# Patient Record
Sex: MALE | Employment: FULL TIME | URBAN - METROPOLITAN AREA
[De-identification: names, ages, dates, MRNs, and addresses within clinical notes are randomized per-mention and may not be internally consistent; named-entity substitution may affect disease eponyms.]

---

## 2019-07-25 NOTE — PROGRESS NOTES
"Subjective     Kirt Burt is a 39 year old male who presents to clinic today for the following health issues:    HPI   Concern -  Lump neck/ Thyroid area -  Mom had Thyroid Cancer  Onset:  1 month    Description:   Palpated lump    Intensity: mild    Progression of Symptoms:  same    Accompanying Signs & Symptoms:   None     Previous history of similar problem:   No    Precipitating factors:   Worsened by: NA    Alleviating factors:  Improved by: NA    Therapies Tried and outcome:     Not tender    He has notice a nodule lateral to his trachea over the past month. It is not tender or bothersome but he is concerned since his mother has thyroid cancer. He does have frequent weight fluctuations but denies any diarrhea, constipation, temperatures intolerance of bowel changes. No difficulty swallowing.     Reviewed and updated as needed this visit by Provider  Tobacco  Allergies  Meds  Problems  Med Hx  Surg Hx  Fam Hx       Review of Systems   GENERAL: +Weight fluctuations depending on diet. Denies fever, fatigue, weakness.  CARDIOVASCULAR: Denies chest pain, shortness of breath, irregular heartbeats, palpitations, or edema.  RESPIRATORY: Denies cough, hemoptysis, and shortness of breath.  GASTROINTESTINAL: Denies nausea, vomiting, change in appetite, abdominal pain, diarrhea, or constipation.  GENITOURINARY: Denies increased frequency, urgency, dysuria, hematuria, or incontinence.   MUSCULOSKELETAL: +Nodule right anterior neck.   NEUROLOGIC: Denies headache, fainting, dizziness, memory loss, numbness, tingling, or seizures.  PSYCHIATRIC: Denies depression, anxiety, mood swings, and thoughts of suicide.  ENDOCRINE: Denies heat and cold intolerance, polydipsia, or polyuria.         Objective    /78   Pulse 76   Temp 98  F (36.7  C)   Resp 16   Ht 1.791 m (5' 10.5\")   Wt 131.5 kg (290 lb)   SpO2 96%   BMI 41.02 kg/m    Body mass index is 41.02 kg/m .  Physical Exam   GENERAL: healthy, alert and " no distress  NECK: no adenopathy. Possible subtle prominence over lateral trachea/thyroid without any tenderness.   RESP: lungs clear to auscultation - no rales, rhonchi or wheezes  CV: regular rate and rhythm, normal S1 S2, no S3 or S4, no murmur, click or rub, no peripheral edema  NEURO: Normal strength and tone, mentation intact and speech normal. Gait is stable.         Assessment & Plan       ICD-10-CM    1. Thyroid nodule E04.1 TSH with free T4 reflex     US Thyroid   2. FH: thyroid cancer Z80.8 TSH with free T4 reflex     US Thyroid        He has noticed a right neck nodule and there was a subtle prominence over the right lateral thyroid area that may be a nodule and with his family history of thyroid cancer, I recommend a TSH along with a thyroid ultrasound. Will notify patient if any other testing is needed once results are back. In the meantime, he will monitor for any neck pain or change in size of the nodule. Will plan on follow up in 1 month for an annual physical with fasting labs as he has not been seen in the clinic for 5 years.       Vijay Bess PA-C  [facility]

## 2019-07-29 ENCOUNTER — OFFICE VISIT (OUTPATIENT)
Dept: FAMILY MEDICINE | Facility: OTHER | Age: 39
End: 2019-07-29
Payer: COMMERCIAL

## 2019-07-29 VITALS
RESPIRATION RATE: 16 BRPM | DIASTOLIC BLOOD PRESSURE: 78 MMHG | SYSTOLIC BLOOD PRESSURE: 111 MMHG | TEMPERATURE: 98 F | BODY MASS INDEX: 40.6 KG/M2 | WEIGHT: 290 LBS | HEART RATE: 76 BPM | HEIGHT: 71 IN | OXYGEN SATURATION: 96 %

## 2019-07-29 DIAGNOSIS — Z80.8 FH: THYROID CANCER: ICD-10-CM

## 2019-07-29 DIAGNOSIS — E04.1 THYROID NODULE: Primary | ICD-10-CM

## 2019-07-29 PROCEDURE — 99203 OFFICE O/P NEW LOW 30 MIN: CPT | Performed by: PHYSICIAN ASSISTANT

## 2019-07-29 PROCEDURE — 84443 ASSAY THYROID STIM HORMONE: CPT | Performed by: PHYSICIAN ASSISTANT

## 2019-07-29 PROCEDURE — 36415 COLL VENOUS BLD VENIPUNCTURE: CPT | Performed by: PHYSICIAN ASSISTANT

## 2019-07-29 ASSESSMENT — PAIN SCALES - GENERAL: PAINLEVEL: NO PAIN (0)

## 2019-07-29 ASSESSMENT — MIFFLIN-ST. JEOR: SCORE: 2244.62

## 2019-07-29 NOTE — PATIENT INSTRUCTIONS
Will order thyroid labs along with an ultrasound of your thyroid to determine if there are any nodules at all.    I recommend a physical with fasting labs over the next few months.     Thyroid Ultrasound:  [place]: [phone]  [place]: [phone]

## 2019-07-30 ENCOUNTER — TELEPHONE (OUTPATIENT)
Dept: FAMILY MEDICINE | Facility: OTHER | Age: 39
End: 2019-07-30

## 2019-07-30 LAB — TSH SERPL DL<=0.005 MIU/L-ACNC: 1.52 MU/L (ref 0.4–4)

## 2019-07-30 NOTE — LETTER
July 31, 2019      Kirt Burt  [address] [facility] [address]        Dear Kirt,     This letter is to inform you that your recent lab testing was normal.    Results for orders placed or performed in visit on 07/29/19   TSH with free T4 reflex   Result Value Ref Range    TSH 1.52 0.40 - 4.00 mU/L     Please let us know if you have any further questions or concerns.    Thanks,  [facility]

## 2019-07-30 NOTE — TELEPHONE ENCOUNTER
----- Message from Vijay Bess PA-C sent at 7/30/2019  4:35 PM CDT -----  Please call and notify patient that his thyroid labs are normal. Thanks.    Vijay Bess PA-C

## 2019-07-31 ENCOUNTER — TELEPHONE (OUTPATIENT)
Dept: FAMILY MEDICINE | Facility: OTHER | Age: 39
End: 2019-07-31

## 2019-07-31 ENCOUNTER — ANCILLARY PROCEDURE (OUTPATIENT)
Dept: ULTRASOUND IMAGING | Facility: CLINIC | Age: 39
End: 2019-07-31
Attending: PHYSICIAN ASSISTANT
Payer: COMMERCIAL

## 2019-07-31 DIAGNOSIS — E04.1 THYROID NODULE: ICD-10-CM

## 2019-07-31 DIAGNOSIS — Z80.8 FH: THYROID CANCER: ICD-10-CM

## 2019-07-31 PROCEDURE — 76536 US EXAM OF HEAD AND NECK: CPT | Performed by: RADIOLOGY

## 2019-07-31 NOTE — TELEPHONE ENCOUNTER
Left message asking patient to return call.  Please inform patient of RESULTS from Provider below.    Please call and notify patient that he has a small right sided thyroid nodule which is what he is feeling. It appears to be a benign nodule so no further follow up is necessary for this. Thank you.     Vijay Bess PA-C

## 2020-11-14 ENCOUNTER — HEALTH MAINTENANCE LETTER (OUTPATIENT)
Age: 40
End: 2020-11-14

## 2021-02-24 ENCOUNTER — OFFICE VISIT (OUTPATIENT)
Dept: SURGERY | Facility: CLINIC | Age: 41
End: 2021-02-24
Payer: COMMERCIAL

## 2021-02-24 VITALS
SYSTOLIC BLOOD PRESSURE: 112 MMHG | WEIGHT: 271 LBS | BODY MASS INDEX: 38.33 KG/M2 | HEART RATE: 65 BPM | DIASTOLIC BLOOD PRESSURE: 75 MMHG

## 2021-02-24 DIAGNOSIS — M79.89 SOFT TISSUE MASS: ICD-10-CM

## 2021-02-24 DIAGNOSIS — K57.20 PERFORATION OF SIGMOID COLON DUE TO DIVERTICULITIS: Primary | ICD-10-CM

## 2021-02-24 PROCEDURE — 99214 OFFICE O/P EST MOD 30 MIN: CPT | Performed by: SURGERY

## 2021-02-24 RX ORDER — NIACINAMIDE 500 MG
TABLET, EXTENDED RELEASE ORAL PRN
COMMUNITY
End: 2024-05-13

## 2021-02-24 NOTE — LETTER
2/24/2021         RE: Kirt Burt  [address] [place] [address]        Dear Colleague,    Thank you for referring your patient, Kirt Burt, to the [facility]. Please see a copy of my visit note below.    Patient seen for hospital follow up from perforated diverticulitis    HPI:  Patient is a 40 year old male  Known to me from hospital admission at [facility] earlier this month on February 7 with perforated diverticulitis  He was treated with Zosyn in the hospital and improved with the antibiotics and conservative measures with no need for emergent surgery.  He was discharged on Augmentin to complete his antibiotic course.  His CAT scan from the emergency room had additionally shown a large left gluteal lipoma  Currently feeling well.  No pain.  Bowels are moving well.  He is on a low fiber diet since discharge.  No fevers.    History from the hospitalization:  The patient is 40 y.o. male admitted on 2/7/2021 with about 3 day history of LLQ pain. Pain started overnight Thursday 2/4 and persisted through today. Pain has been primarily in LLQ but at times will feel something in suprapubic or RLQ area. Pain was moderate to severe Friday then maybe improved slightly on Saturday. Last night the pain seemed to get much worse so presented to ER today. Pain has been associated wth nausea and vomiting and fever, chills. Has been still passig flatus. Last BM was possibly Saturday and seemed normal.   Initially in ER was febrile and tachycardic with pain about 8/10. After fluids, tylenol, pain meds And starting zosyn pain now rated 3-4/10, is more comfortable, HR no longer tachycardic.      Review Of Systems    Skin: negative  Ears/Nose/Throat: negative  Respiratory: No shortness of breath, dyspnea on exertion, cough, or hemoptysis  Cardiovascular: negative  Gastrointestinal: as above  Genitourinary: negative  Musculoskeletal: gluteal lipoma  Neurologic:  negative  Hematologic/Lymphatic/Immunologic: negative  Endocrine: negative      Past Medical History:   Diagnosis Date     NO ACTIVE PROBLEMS        Past Surgical History:   Procedure Laterality Date     ARTHROSCOPY KNEE WITH MEDIAL MENISCECTOMY  5/7/2014    Procedure: ARTHROSCOPY KNEE WITH MEDIAL MENISCECTOMY;  Surgeon: Artis Dougherty MD;  Location: [facility] OR     NO HISTORY OF SURGERY         Social History     Socioeconomic History     Marital status:      Spouse name: Not on file     Number of children: 2     Years of education: Not on file     Highest education level: Not on file   Occupational History     Employer: [employer]   Social Needs     Financial resource strain: Not on file     Food insecurity     Worry: Not on file     Inability: Not on file     Transportation needs     Medical: Not on file     Non-medical: Not on file   Tobacco Use     Smoking status: Never Smoker     Smokeless tobacco: Never Used   Substance and Sexual Activity     Alcohol use: Yes     Comment: rare     Drug use: No     Sexual activity: Yes     Partners: Female     Birth control/protection: Condom   Lifestyle     Physical activity     Days per week: Not on file     Minutes per session: Not on file     Stress: Not on file   Relationships     Social connections     Talks on phone: Not on file     Gets together: Not on file     Attends Jain service: Not on file     Active member of club or organization: Not on file     Attends meetings of clubs or organizations: Not on file     Relationship status: Not on file     Intimate partner violence     Fear of current or ex partner: Not on file     Emotionally abused: Not on file     Physically abused: Not on file     Forced sexual activity: Not on file   Other Topics Concern     Parent/sibling w/ CABG, MI or angioplasty before 65F 55M? No   Social History Narrative     Not on file       Current Outpatient Medications   Medication Sig Dispense Refill      Digestive Enzymes CAPS        Probiotic Product (PRO-BIOTIC BLEND PO)        Multiple Vitamin (DAILY MULTIVITAMIN PO) Take 1 tablet by mouth daily.       senna-docusate (SENOKOT-S;PERICOLACE) 8.6-50 MG per tablet Take 1-2 tablets by mouth 2 times daily Take while on oral narcotics to prevent or treat constipation. (Patient not taking: Reported on 7/29/2019) 30 tablet 0       Medications and history reviewed    Physical exam:  Vitals: /75   Pulse 65   Wt 122.9 kg (271 lb)   BMI 38.33 kg/m    BMI= Body mass index is 38.33 kg/m .    Constitutional: healthy, alert and no distress  Head: Normocephalic. No masses, lesions, tenderness or abnormalities  Musculoskeletal: extremities normal- no gross deformities noted, gait normal, normal muscle tone and left buttock noticeably larger than right with palpable subcutaneous mass, nontender.      Imaging shows:  EXAM: CT ABDOMEN & PELVIS W/O ORAL W IV CON    DATE: 2/7/2021 4:07 PM    CLINICAL DATA: Left lower quadrant pain and fever starting 2 days ago.    COMPARISON: None.    TECHNIQUE: Thin-section contiguous transaxial images were obtained through the abdomen and pelvis with intravenous  contrast. Multiplanar reformations were also obtained through the abdomen and pelvis.  A total of 100 cc of Omnipaque 350 were administered intravenously for this study. No oral contrast was administered.    FINDINGS:     Liver: There is diffuse fatty infiltration of the liver.    Spleen: Normal.    Pancreas: Normal.    Adrenal Glands: Normal.    Kidneys: Bilateral symmetric excretion without obstruction.    No mass or other abnormality.    Gallbladder: Normal CT appearance.    Lymph Nodes: No lymphadenopathy in the abdomen or pelvis.    There is prominent pericolonic inflammatory change seen involving the mid sigmoid colon, with an associated contained perforation. There is free air seen within the upper pelvis, predominantly located posterior and superior to the inflamed loop of  sigmoid colon. There is some colonic thickening and multiple diverticula, consistent with perforated sigmoid diverticulitis. There is also a tiny dot of free air are seen slightly more superiorly anterior to the junction of the descending and proximal sigmoid colon. There is no free intraperitoneal air seen within the upper abdomen. No bowel obstruction. Normal appendix visualized.    There is no free intraperitoneal fluid or abscess collection.    The abdominal aorta is of normal appearance without aneurysm. No retroperitoneal hemorrhage. There are small bilateral fat-containing inguinal hernias left slightly larger than right.    There is a large fat density mass seen within the left gluteus jose angel, measuring 12.7 x 7.4 x 10 cm consistent with a lipoma.    Minor scattered atelectasis at the lung bases.    Assessment:     ICD-10-CM    1. Perforation of sigmoid colon due to diverticulitis  K57.20 GASTROENTEROLOGY ADULT REF PROCEDURE ONLY   2. Soft tissue mass  M79.89 MR Pelvis (GYN) wo & w Contrast     Plan: First episode of diverticulitis, complicated by contained perforation, no abscess. Recovered well with antibiotics.  Will proceed with colonoscopy in about 1 month (about 6 weeks from hospitalization) to get direct look at colon  Patient also had large presumed lipoma of buttock and would be interested in possible removal. Due to size of mass would like additional images with MRI completed, though nothing about the CT images were concerning for other type of mass or sarcoma.  Continue low fiber diet for now and will transition to high fiber after scope completed  Follow up with me after imaging and colonoscopy completed to discuss possible elective sigmoid resection and resection of buttock mass more.    Daniel Doll MD        Again, thank you for allowing me to participate in the care of your patient.        Sincerely,        Daniel Doll MD

## 2021-02-24 NOTE — PROGRESS NOTES
Patient seen for hospital follow up from perforated diverticulitis    HPI:  Patient is a 40 year old male  Known to me from hospital admission at [facility] earlier this month on February 7 with perforated diverticulitis  He was treated with Zosyn in the hospital and improved with the antibiotics and conservative measures with no need for emergent surgery.  He was discharged on Augmentin to complete his antibiotic course.  His CAT scan from the emergency room had additionally shown a large left gluteal lipoma  Currently feeling well.  No pain.  Bowels are moving well.  He is on a low fiber diet since discharge.  No fevers.    History from the hospitalization:  The patient is 40 y.o. male admitted on 2/7/2021 with about 3 day history of LLQ pain. Pain started overnight Thursday 2/4 and persisted through today. Pain has been primarily in LLQ but at times will feel something in suprapubic or RLQ area. Pain was moderate to severe Friday then maybe improved slightly on Saturday. Last night the pain seemed to get much worse so presented to ER today. Pain has been associated wth nausea and vomiting and fever, chills. Has been still passig flatus. Last BM was possibly Saturday and seemed normal.   Initially in ER was febrile and tachycardic with pain about 8/10. After fluids, tylenol, pain meds And starting zosyn pain now rated 3-4/10, is more comfortable, HR no longer tachycardic.      Review Of Systems    Skin: negative  Ears/Nose/Throat: negative  Respiratory: No shortness of breath, dyspnea on exertion, cough, or hemoptysis  Cardiovascular: negative  Gastrointestinal: as above  Genitourinary: negative  Musculoskeletal: gluteal lipoma  Neurologic: negative  Hematologic/Lymphatic/Immunologic: negative  Endocrine: negative      Past Medical History:   Diagnosis Date     NO ACTIVE PROBLEMS        Past Surgical History:   Procedure Laterality Date     ARTHROSCOPY KNEE WITH MEDIAL MENISCECTOMY  5/7/2014     Procedure: ARTHROSCOPY KNEE WITH MEDIAL MENISCECTOMY;  Surgeon: Artis Dougherty MD;  Location: [facility] OR     NO HISTORY OF SURGERY         Social History     Socioeconomic History     Marital status:      Spouse name: Not on file     Number of children: 2     Years of education: Not on file     Highest education level: Not on file   Occupational History     Employer: [employer]   Social Needs     Financial resource strain: Not on file     Food insecurity     Worry: Not on file     Inability: Not on file     Transportation needs     Medical: Not on file     Non-medical: Not on file   Tobacco Use     Smoking status: Never Smoker     Smokeless tobacco: Never Used   Substance and Sexual Activity     Alcohol use: Yes     Comment: rare     Drug use: No     Sexual activity: Yes     Partners: Female     Birth control/protection: Condom   Lifestyle     Physical activity     Days per week: Not on file     Minutes per session: Not on file     Stress: Not on file   Relationships     Social connections     Talks on phone: Not on file     Gets together: Not on file     Attends Caodaism service: Not on file     Active member of club or organization: Not on file     Attends meetings of clubs or organizations: Not on file     Relationship status: Not on file     Intimate partner violence     Fear of current or ex partner: Not on file     Emotionally abused: Not on file     Physically abused: Not on file     Forced sexual activity: Not on file   Other Topics Concern     Parent/sibling w/ CABG, MI or angioplasty before 65F 55M? No   Social History Narrative     Not on file       Current Outpatient Medications   Medication Sig Dispense Refill     Digestive Enzymes CAPS        Probiotic Product (PRO-BIOTIC BLEND PO)        Multiple Vitamin (DAILY MULTIVITAMIN PO) Take 1 tablet by mouth daily.       senna-docusate (SENOKOT-S;PERICOLACE) 8.6-50 MG per tablet Take 1-2 tablets by mouth 2 times daily Take  while on oral narcotics to prevent or treat constipation. (Patient not taking: Reported on 7/29/2019) 30 tablet 0       Medications and history reviewed    Physical exam:  Vitals: /75   Pulse 65   Wt 122.9 kg (271 lb)   BMI 38.33 kg/m    BMI= Body mass index is 38.33 kg/m .    Constitutional: healthy, alert and no distress  Head: Normocephalic. No masses, lesions, tenderness or abnormalities  Musculoskeletal: extremities normal- no gross deformities noted, gait normal, normal muscle tone and left buttock noticeably larger than right with palpable subcutaneous mass, nontender.      Imaging shows:  EXAM: CT ABDOMEN & PELVIS W/O ORAL W IV CON    DATE: 2/7/2021 4:07 PM    CLINICAL DATA: Left lower quadrant pain and fever starting 2 days ago.    COMPARISON: None.    TECHNIQUE: Thin-section contiguous transaxial images were obtained through the abdomen and pelvis with intravenous  contrast. Multiplanar reformations were also obtained through the abdomen and pelvis.  A total of 100 cc of Omnipaque 350 were administered intravenously for this study. No oral contrast was administered.    FINDINGS:     Liver: There is diffuse fatty infiltration of the liver.    Spleen: Normal.    Pancreas: Normal.    Adrenal Glands: Normal.    Kidneys: Bilateral symmetric excretion without obstruction.    No mass or other abnormality.    Gallbladder: Normal CT appearance.    Lymph Nodes: No lymphadenopathy in the abdomen or pelvis.    There is prominent pericolonic inflammatory change seen involving the mid sigmoid colon, with an associated contained perforation. There is free air seen within the upper pelvis, predominantly located posterior and superior to the inflamed loop of sigmoid colon. There is some colonic thickening and multiple diverticula, consistent with perforated sigmoid diverticulitis. There is also a tiny dot of free air are seen slightly more superiorly anterior to the junction of the descending and proximal sigmoid  colon. There is no free intraperitoneal air seen within the upper abdomen. No bowel obstruction. Normal appendix visualized.    There is no free intraperitoneal fluid or abscess collection.    The abdominal aorta is of normal appearance without aneurysm. No retroperitoneal hemorrhage. There are small bilateral fat-containing inguinal hernias left slightly larger than right.    There is a large fat density mass seen within the left gluteus jose angel, measuring 12.7 x 7.4 x 10 cm consistent with a lipoma.    Minor scattered atelectasis at the lung bases.    Assessment:     ICD-10-CM    1. Perforation of sigmoid colon due to diverticulitis  K57.20 GASTROENTEROLOGY ADULT REF PROCEDURE ONLY   2. Soft tissue mass  M79.89 MR Pelvis (GYN) wo & w Contrast     Plan: First episode of diverticulitis, complicated by contained perforation, no abscess. Recovered well with antibiotics.  Will proceed with colonoscopy in about 1 month (about 6 weeks from hospitalization) to get direct look at colon  Patient also had large presumed lipoma of buttock and would be interested in possible removal. Due to size of mass would like additional images with MRI completed, though nothing about the CT images were concerning for other type of mass or sarcoma.  Continue low fiber diet for now and will transition to high fiber after scope completed  Follow up with me after imaging and colonoscopy completed to discuss possible elective sigmoid resection and resection of buttock mass more.    Daniel Doll MD

## 2021-03-14 DIAGNOSIS — Z11.59 ENCOUNTER FOR SCREENING FOR OTHER VIRAL DISEASES: Primary | ICD-10-CM

## 2021-03-23 RX ORDER — SODIUM, POTASSIUM,MAG SULFATES 17.5-3.13G
2 SOLUTION, RECONSTITUTED, ORAL ORAL SEE ADMIN INSTRUCTIONS
Qty: 354 ML | Refills: 0 | Status: SHIPPED | OUTPATIENT
Start: 2021-03-23 | End: 2021-08-17

## 2021-03-23 RX ORDER — BISACODYL 5 MG
5 TABLET, DELAYED RELEASE (ENTERIC COATED) ORAL SEE ADMIN INSTRUCTIONS
Qty: 1 TABLET | Refills: 0 | Status: SHIPPED | OUTPATIENT
Start: 2021-03-23 | End: 2021-08-17

## 2021-03-26 DIAGNOSIS — Z11.59 ENCOUNTER FOR SCREENING FOR OTHER VIRAL DISEASES: ICD-10-CM

## 2021-03-26 LAB
LABORATORY COMMENT REPORT: NORMAL
SARS-COV-2 RNA RESP QL NAA+PROBE: NEGATIVE
SARS-COV-2 RNA RESP QL NAA+PROBE: NORMAL
SPECIMEN SOURCE: NORMAL
SPECIMEN SOURCE: NORMAL

## 2021-03-26 PROCEDURE — U0003 INFECTIOUS AGENT DETECTION BY NUCLEIC ACID (DNA OR RNA); SEVERE ACUTE RESPIRATORY SYNDROME CORONAVIRUS 2 (SARS-COV-2) (CORONAVIRUS DISEASE [COVID-19]), AMPLIFIED PROBE TECHNIQUE, MAKING USE OF HIGH THROUGHPUT TECHNOLOGIES AS DESCRIBED BY CMS-2020-01-R: HCPCS | Performed by: SURGERY

## 2021-03-26 PROCEDURE — U0005 INFEC AGEN DETEC AMPLI PROBE: HCPCS | Performed by: SURGERY

## 2021-03-30 ENCOUNTER — HOSPITAL ENCOUNTER (OUTPATIENT)
Facility: AMBULATORY SURGERY CENTER | Age: 41
Discharge: HOME | End: 2021-03-30
Attending: SURGERY | Admitting: SURGERY
Payer: COMMERCIAL

## 2021-03-30 VITALS
HEART RATE: 69 BPM | OXYGEN SATURATION: 96 % | SYSTOLIC BLOOD PRESSURE: 116 MMHG | TEMPERATURE: 97.4 F | DIASTOLIC BLOOD PRESSURE: 81 MMHG | RESPIRATION RATE: 16 BRPM

## 2021-03-30 DIAGNOSIS — Z12.11 COLON CANCER SCREENING: Primary | ICD-10-CM

## 2021-03-30 LAB — COLONOSCOPY: NORMAL

## 2021-03-30 PROCEDURE — 45380 COLONOSCOPY AND BIOPSY: CPT

## 2021-03-30 PROCEDURE — 45380 COLONOSCOPY AND BIOPSY: CPT | Performed by: SURGERY

## 2021-03-30 PROCEDURE — G8918 PT W/O PREOP ORDER IV AB PRO: HCPCS

## 2021-03-30 PROCEDURE — G8907 PT DOC NO EVENTS ON DISCHARG: HCPCS

## 2021-03-30 PROCEDURE — 88305 TISSUE EXAM BY PATHOLOGIST: CPT | Performed by: PATHOLOGY

## 2021-03-30 RX ORDER — NALOXONE HYDROCHLORIDE 0.4 MG/ML
0.2 INJECTION, SOLUTION INTRAMUSCULAR; INTRAVENOUS; SUBCUTANEOUS
Status: CANCELLED | OUTPATIENT
Start: 2021-03-30

## 2021-03-30 RX ORDER — ONDANSETRON 4 MG/1
4 TABLET, ORALLY DISINTEGRATING ORAL EVERY 6 HOURS PRN
Status: CANCELLED | OUTPATIENT
Start: 2021-03-30

## 2021-03-30 RX ORDER — FENTANYL CITRATE 50 UG/ML
INJECTION, SOLUTION INTRAMUSCULAR; INTRAVENOUS PRN
Status: DISCONTINUED | OUTPATIENT
Start: 2021-03-30 | End: 2021-03-30 | Stop reason: HOSPADM

## 2021-03-30 RX ORDER — FLUMAZENIL 0.1 MG/ML
0.2 INJECTION, SOLUTION INTRAVENOUS
Status: CANCELLED | OUTPATIENT
Start: 2021-03-30 | End: 2021-03-30

## 2021-03-30 RX ORDER — PROCHLORPERAZINE MALEATE 10 MG
10 TABLET ORAL EVERY 6 HOURS PRN
Status: CANCELLED | OUTPATIENT
Start: 2021-03-30

## 2021-03-30 RX ORDER — ONDANSETRON 2 MG/ML
4 INJECTION INTRAMUSCULAR; INTRAVENOUS
Status: DISCONTINUED | OUTPATIENT
Start: 2021-03-30 | End: 2021-03-31 | Stop reason: HOSPADM

## 2021-03-30 RX ORDER — ONDANSETRON 2 MG/ML
4 INJECTION INTRAMUSCULAR; INTRAVENOUS EVERY 6 HOURS PRN
Status: CANCELLED | OUTPATIENT
Start: 2021-03-30

## 2021-03-30 RX ORDER — NALOXONE HYDROCHLORIDE 0.4 MG/ML
0.4 INJECTION, SOLUTION INTRAMUSCULAR; INTRAVENOUS; SUBCUTANEOUS
Status: CANCELLED | OUTPATIENT
Start: 2021-03-30

## 2021-03-30 RX ORDER — LIDOCAINE 40 MG/G
CREAM TOPICAL
Status: DISCONTINUED | OUTPATIENT
Start: 2021-03-30 | End: 2021-03-31 | Stop reason: HOSPADM

## 2021-04-01 LAB — COPATH REPORT: NORMAL

## 2021-08-16 ASSESSMENT — ENCOUNTER SYMPTOMS
DIARRHEA: 0
CONSTIPATION: 0
EYE PAIN: 0
DYSURIA: 0
HEMATOCHEZIA: 0
PARESTHESIAS: 0
NAUSEA: 0
SHORTNESS OF BREATH: 0
FREQUENCY: 0
HEARTBURN: 0
DIZZINESS: 0
HEMATURIA: 0
PALPITATIONS: 0
CHILLS: 0
SORE THROAT: 0
FEVER: 0
JOINT SWELLING: 0
ARTHRALGIAS: 0
COUGH: 0
ABDOMINAL PAIN: 0
WEAKNESS: 0
NERVOUS/ANXIOUS: 0
HEADACHES: 0
MYALGIAS: 0

## 2021-08-17 ENCOUNTER — OFFICE VISIT (OUTPATIENT)
Dept: FAMILY MEDICINE | Facility: OTHER | Age: 41
End: 2021-08-17
Payer: COMMERCIAL

## 2021-08-17 VITALS
HEIGHT: 70 IN | OXYGEN SATURATION: 95 % | BODY MASS INDEX: 41.01 KG/M2 | WEIGHT: 286.5 LBS | HEART RATE: 80 BPM | DIASTOLIC BLOOD PRESSURE: 80 MMHG | TEMPERATURE: 97.1 F | RESPIRATION RATE: 16 BRPM | SYSTOLIC BLOOD PRESSURE: 108 MMHG

## 2021-08-17 DIAGNOSIS — Z11.59 NEED FOR HEPATITIS C SCREENING TEST: ICD-10-CM

## 2021-08-17 DIAGNOSIS — Z13.220 SCREENING FOR HYPERLIPIDEMIA: ICD-10-CM

## 2021-08-17 DIAGNOSIS — Z23 HIGH PRIORITY FOR 2019-NCOV VACCINE: ICD-10-CM

## 2021-08-17 DIAGNOSIS — F41.0 PANIC ATTACK: ICD-10-CM

## 2021-08-17 DIAGNOSIS — F41.1 GAD (GENERALIZED ANXIETY DISORDER): ICD-10-CM

## 2021-08-17 DIAGNOSIS — R42 VERTIGO: ICD-10-CM

## 2021-08-17 DIAGNOSIS — Z13.220 SCREENING FOR LIPOID DISORDERS: ICD-10-CM

## 2021-08-17 DIAGNOSIS — Z00.00 ROUTINE GENERAL MEDICAL EXAMINATION AT A HEALTH CARE FACILITY: Primary | ICD-10-CM

## 2021-08-17 DIAGNOSIS — H93.13 TINNITUS, BILATERAL: ICD-10-CM

## 2021-08-17 DIAGNOSIS — Z11.4 SCREENING FOR HIV (HUMAN IMMUNODEFICIENCY VIRUS): ICD-10-CM

## 2021-08-17 DIAGNOSIS — E66.01 MORBID OBESITY (H): ICD-10-CM

## 2021-08-17 DIAGNOSIS — R73.9 HYPERGLYCEMIA: ICD-10-CM

## 2021-08-17 PROBLEM — K57.20 DIVERTICULITIS OF COLON WITH PERFORATION: Status: ACTIVE | Noted: 2021-02-07

## 2021-08-17 LAB
CHOLEST SERPL-MCNC: 202 MG/DL
FASTING STATUS PATIENT QL REPORTED: NO
FASTING STATUS PATIENT QL REPORTED: NO
GLUCOSE BLD-MCNC: 93 MG/DL (ref 70–99)
HBA1C MFR BLD: 5.4 % (ref 0–5.6)
HDLC SERPL-MCNC: 43 MG/DL
LDLC SERPL CALC-MCNC: 128 MG/DL
NONHDLC SERPL-MCNC: 159 MG/DL
TRIGL SERPL-MCNC: 154 MG/DL

## 2021-08-17 PROCEDURE — 80061 LIPID PANEL: CPT | Performed by: FAMILY MEDICINE

## 2021-08-17 PROCEDURE — 99396 PREV VISIT EST AGE 40-64: CPT | Performed by: FAMILY MEDICINE

## 2021-08-17 PROCEDURE — 36415 COLL VENOUS BLD VENIPUNCTURE: CPT | Performed by: FAMILY MEDICINE

## 2021-08-17 PROCEDURE — 91300 COVID-19,PF,PFIZER: CPT | Performed by: FAMILY MEDICINE

## 2021-08-17 PROCEDURE — 82947 ASSAY GLUCOSE BLOOD QUANT: CPT | Performed by: FAMILY MEDICINE

## 2021-08-17 PROCEDURE — 86803 HEPATITIS C AB TEST: CPT | Performed by: FAMILY MEDICINE

## 2021-08-17 PROCEDURE — 0001A COVID-19,PF,PFIZER: CPT | Performed by: FAMILY MEDICINE

## 2021-08-17 PROCEDURE — 99213 OFFICE O/P EST LOW 20 MIN: CPT | Mod: 25 | Performed by: FAMILY MEDICINE

## 2021-08-17 PROCEDURE — 83036 HEMOGLOBIN GLYCOSYLATED A1C: CPT | Performed by: FAMILY MEDICINE

## 2021-08-17 PROCEDURE — 87389 HIV-1 AG W/HIV-1&-2 AB AG IA: CPT | Performed by: FAMILY MEDICINE

## 2021-08-17 RX ORDER — MECLIZINE HYDROCHLORIDE 25 MG/1
25 TABLET ORAL 3 TIMES DAILY PRN
Qty: 30 TABLET | Refills: 3 | Status: SHIPPED | OUTPATIENT
Start: 2021-08-17 | End: 2024-05-13

## 2021-08-17 RX ORDER — ALPRAZOLAM 0.25 MG
.25-.5 TABLET ORAL 3 TIMES DAILY PRN
Qty: 10 TABLET | Refills: 1 | Status: SHIPPED | OUTPATIENT
Start: 2021-08-17 | End: 2024-05-13

## 2021-08-17 RX ORDER — MECLIZINE HYDROCHLORIDE 25 MG/1
25 TABLET ORAL PRN
COMMUNITY
End: 2021-08-17

## 2021-08-17 ASSESSMENT — ENCOUNTER SYMPTOMS
FREQUENCY: 0
MYALGIAS: 0
HEADACHES: 0
CHILLS: 0
DIZZINESS: 0
HEARTBURN: 0
FEVER: 0
HEMATURIA: 0
DIARRHEA: 0
PALPITATIONS: 0
SORE THROAT: 0
WEAKNESS: 0
SHORTNESS OF BREATH: 0
JOINT SWELLING: 0
NERVOUS/ANXIOUS: 0
HEMATOCHEZIA: 0
NAUSEA: 0
ABDOMINAL PAIN: 0
CONSTIPATION: 0
EYE PAIN: 0
DYSURIA: 0
COUGH: 0
PARESTHESIAS: 0
ARTHRALGIAS: 0

## 2021-08-17 ASSESSMENT — ANXIETY QUESTIONNAIRES
IF YOU CHECKED OFF ANY PROBLEMS ON THIS QUESTIONNAIRE, HOW DIFFICULT HAVE THESE PROBLEMS MADE IT FOR YOU TO DO YOUR WORK, TAKE CARE OF THINGS AT HOME, OR GET ALONG WITH OTHER PEOPLE: VERY DIFFICULT
GAD7 TOTAL SCORE: 15
1. FEELING NERVOUS, ANXIOUS, OR ON EDGE: NEARLY EVERY DAY
3. WORRYING TOO MUCH ABOUT DIFFERENT THINGS: MORE THAN HALF THE DAYS
2. NOT BEING ABLE TO STOP OR CONTROL WORRYING: MORE THAN HALF THE DAYS
7. FEELING AFRAID AS IF SOMETHING AWFUL MIGHT HAPPEN: SEVERAL DAYS
6. BECOMING EASILY ANNOYED OR IRRITABLE: MORE THAN HALF THE DAYS
5. BEING SO RESTLESS THAT IT IS HARD TO SIT STILL: NEARLY EVERY DAY

## 2021-08-17 ASSESSMENT — PAIN SCALES - GENERAL: PAINLEVEL: NO PAIN (0)

## 2021-08-17 ASSESSMENT — MIFFLIN-ST. JEOR: SCORE: 2218.3

## 2021-08-17 ASSESSMENT — PATIENT HEALTH QUESTIONNAIRE - PHQ9
5. POOR APPETITE OR OVEREATING: MORE THAN HALF THE DAYS
SUM OF ALL RESPONSES TO PHQ QUESTIONS 1-9: 9

## 2021-08-17 NOTE — PROGRESS NOTES
"SUBJECTIVE:   CC: Kirt Burt is an 41 year old male who presents for preventative health visit.     Patient has been advised of split billing requirements and indicates understanding: Yes     Healthy Habits:     Getting at least 3 servings of Calcium per day:  Yes    Bi-annual eye exam:  NO    Dental care twice a year:  NO    Sleep apnea or symptoms of sleep apnea:  Excessive snoring    Diet:  Gluten-free/reduced    Frequency of exercise:  None    Taking medications regularly:  Yes    Medication side effects:  None    PHQ-2 Total Score: 0    Additional concerns today:  No    Ringing in the ears, dizziness - takes meclizine prn.     Abnormal Mood Symptoms  Onset: started about 4 months or so.     Description:   Depression: no   Anxiety: YES    Accompanying Signs & Symptoms: shaking of the hands, loses motivation.   Still participating in activities that you used to enjoy: YES  Fatigue: YES  Irritability: no   Difficulty concentrating: YES- lately  Changes in appetite: no - does have a \"pit feeling\" in stomach.   Problems with sleep: YES  Heart racing/beating fast : no  Thoughts of hurting yourself or others: none    History:   Recent stress: YES  Prior depression hospitalization: None  Family history of depression: not sure   Family history of anxiety: not sure     Precipitating factors:   Alcohol/drug use: no    Alleviating factors:  Has been seeing a therapist for the last month.   Therapies Tried and outcome: Ritalin in 8th grade, treated anxiety with Xanax. Has been trying to manage through breathing.     Today's PHQ-2 Score:   PHQ-2 ( 1999 Pfizer) 8/16/2021   Q1: Little interest or pleasure in doing things 0   Q2: Feeling down, depressed or hopeless 0   PHQ-2 Score 0   Q1: Little interest or pleasure in doing things Not at all   Q2: Feeling down, depressed or hopeless Not at all   PHQ-2 Score 0     Abuse: Current or Past(Physical, Sexual or Emotional)- No  Do you feel safe in your environment? Yes    Have " you ever done Advance Care Planning? (For example, a Health Directive, POLST, or a discussion with a medical provider or your loved ones about your wishes): No, advance care planning information given to patient to review.  Advanced care planning was discussed at today's visit.    Social History     Tobacco Use     Smoking status: Never Smoker     Smokeless tobacco: Never Used   Substance Use Topics     Alcohol use: Yes     Comment: rare     Alcohol Use 8/16/2021   Prescreen: >3 drinks/day or >7 drinks/week? No   Prescreen: >3 drinks/day or >7 drinks/week? -     Last PSA: No results found for: PSA    Reviewed orders with patient. Reviewed health maintenance and updated orders accordingly - Yes  BP Readings from Last 3 Encounters:   08/17/21 108/80   03/30/21 116/81   02/24/21 112/75    Wt Readings from Last 3 Encounters:   08/17/21 130 kg (286 lb 8 oz)   02/24/21 122.9 kg (271 lb)   07/29/19 131.5 kg (290 lb)                  Patient Active Problem List   Diagnosis     Hypogonadism male     Tear of medial cartilage or meniscus of knee, current     Diverticulitis of colon with perforation     Morbid obesity (H)     KAT (generalized anxiety disorder)     Past Surgical History:   Procedure Laterality Date     ARTHROSCOPY KNEE WITH MEDIAL MENISCECTOMY  5/7/2014    Procedure: ARTHROSCOPY KNEE WITH MEDIAL MENISCECTOMY;  Surgeon: Artis Dougherty MD;  Location: [facility]     NO HISTORY OF SURGERY         Social History     Tobacco Use     Smoking status: Never Smoker     Smokeless tobacco: Never Used   Substance Use Topics     Alcohol use: Yes     Comment: rare     Family History   Problem Relation Age of Onset     Hypertension Mother      Thyroid Disease Mother         Thyroidectomy-CA     Diabetes Mother      C.A.D. Maternal Grandmother         hx of bypass     Family History Negative No family hx of          Current Outpatient Medications   Medication Sig Dispense Refill     ALPRAZolam (XANAX) 0.25 MG tablet Take 1-2  "tablets (0.25-0.5 mg) by mouth 3 times daily as needed for anxiety 10 tablet 1     Digestive Enzymes CAPS        meclizine (ANTIVERT) 25 MG tablet Take 1 tablet (25 mg) by mouth 3 times daily as needed for dizziness 30 tablet 3     Probiotic Product (PRO-BIOTIC BLEND PO)        No Known Allergies    Reviewed and updated as needed this visit by clinical staff  Tobacco  Allergies       Soc Hx        Reviewed and updated as needed this visit by Provider                  Past Medical History:   Diagnosis Date     NO ACTIVE PROBLEMS       Past Surgical History:   Procedure Laterality Date     ARTHROSCOPY KNEE WITH MEDIAL MENISCECTOMY  5/7/2014    Procedure: ARTHROSCOPY KNEE WITH MEDIAL MENISCECTOMY;  Surgeon: Artis Dougherty MD;  Location: [facility]     NO HISTORY OF SURGERY         Review of Systems   Constitutional: Negative for chills and fever.   HENT: Negative for congestion, ear pain, hearing loss and sore throat.    Eyes: Negative for pain and visual disturbance.   Respiratory: Negative for cough and shortness of breath.    Cardiovascular: Negative for chest pain, palpitations and peripheral edema.   Gastrointestinal: Negative for abdominal pain, constipation, diarrhea, heartburn, hematochezia and nausea.   Genitourinary: Positive for impotence. Negative for discharge, dysuria, frequency, genital sores, hematuria and urgency.   Musculoskeletal: Negative for arthralgias, joint swelling and myalgias.   Skin: Negative for rash.   Neurological: Negative for dizziness, weakness, headaches and paresthesias.   Psychiatric/Behavioral: Negative for mood changes. The patient is not nervous/anxious.        OBJECTIVE:   /80   Pulse 80   Temp 97.1  F (36.2  C) (Temporal)   Resp 16   Ht 1.79 m (5' 10.47\")   Wt 130 kg (286 lb 8 oz)   SpO2 95%   BMI 40.56 kg/m      Physical Exam  GENERAL: healthy, alert and no distress  EYES: Eyes grossly normal to inspection, PERRL and conjunctivae and sclerae normal  HENT: ear " canals and TM's normal, nose and mouth without ulcers or lesions  NECK: no adenopathy, no asymmetry, masses, or scars and thyroid normal to palpation  RESP: lungs clear to auscultation - no rales, rhonchi or wheezes  CV: regular rate and rhythm, normal S1 S2, no S3 or S4, no murmur, click or rub, no peripheral edema and peripheral pulses strong  ABDOMEN: soft, nontender, no hepatosplenomegaly, no masses and bowel sounds normal  MS: no gross musculoskeletal defects noted, no edema  SKIN: no suspicious lesions or rashes  NEURO: Normal strength and tone, mentation intact and speech normal  PSYCH: mentation appears normal, affect normal/bright    Diagnostic Test Results:  Labs reviewed in Epic    ASSESSMENT/PLAN:   1. Screening for HIV (human immunodeficiency virus)  - HIV Antigen Antibody Combo; Future  - HIV Antigen Antibody Combo    2. Need for hepatitis C screening test  - Hepatitis C Screen Reflex to HCV RNA Quant and Genotype; Future  - Hepatitis C Screen Reflex to HCV RNA Quant and Genotype    3. Screening for hyperlipidemia  - Lipid panel reflex to direct LDL Fasting; Future  - Lipid panel reflex to direct LDL Fasting    4. Routine general medical examination at a health care facility      5. High priority for 2019-nCoV vaccine      6. Morbid obesity (H)  Encouraged diet and lifestyle modifications  Needs recheck in 1 yr    7. Tinnitus, bilateral/Vertigo    Patient with symptoms of tinnitus and vertigo.  Refer to ENT due to concerns of possible Ménière's  - Otolaryngology Referral; Future  - meclizine (ANTIVERT) 25 MG tablet; Take 1 tablet (25 mg) by mouth 3 times daily as needed for dizziness  Dispense: 30 tablet; Refill: 3      8. 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  He will get back to me on whether he would like to start these once he has a discussion with his wife.  Trial Xanax in the interim to be used as needed for social anxiety.  Continue psychology appointments for therapy  - ALPRAZolam (XANAX) 0.25 MG tablet; Take 1-2 tablets (0.25-0.5 mg) by mouth 3 times daily as needed for anxiety  Dispense: 10 tablet; Refill: 1    9. Hyperglycemia  - Glucose; Future  - **A1C FUTURE 3mo; Future  - **A1C FUTURE 3mo  - Glucose    Patient has been advised of split billing requirements and indicates understanding: Yes  COUNSELING:   Reviewed preventive health counseling, as reflected in patient instructions       Regular exercise       Healthy diet/nutrition    Estimated body mass index is 40.56 kg/m  as calculated from the following:    Height as of this encounter: 1.79 m (5' 10.47\").    Weight as of this encounter: 130 kg (286 lb 8 oz).     Weight management plan: Discussed healthy diet and exercise guidelines    He reports that he has never smoked. He has never used smokeless tobacco.      Counseling Resources:  ATP IV Guidelines  Pooled Cohorts Equation Calculator  FRAX Risk Assessment  ICSI Preventive Guidelines  Dietary Guidelines for Americans, 2010  USDA's MyPlate  ASA Prophylaxis  Lung CA Screening    Eileen Abbott MD  [facility]  "

## 2021-08-17 NOTE — RESULT ENCOUNTER NOTE
Mr. Burt    Your recent test results are attached.  Hemoglobin A1c is at 5.4 showing no concerns for diabetes    If you have any questions or concerns please contact me via My Chart or call the clinic at [phone]     Thank You  Eileen Abbott MD.

## 2021-08-18 LAB
HCV AB SERPL QL IA: NONREACTIVE
HIV 1+2 AB+HIV1 P24 AG SERPL QL IA: NONREACTIVE

## 2021-08-18 ASSESSMENT — ANXIETY QUESTIONNAIRES: GAD7 TOTAL SCORE: 15

## 2021-08-20 NOTE — RESULT ENCOUNTER NOTE
Mr. Burt    Your recent test results are attached.  Mildly improved cholesterol levels compared to last test 8 years ago.  Negative HIV and hepatitis C test.  Advise weight loss, regular exercise to help improve cholesterol levels further.  Needs recheck in 1 year    If you have any questions or concerns please contact me via My Chart or call the clinic at [phone]     Thank You  Eileen Abbott MD.

## 2021-08-26 ENCOUNTER — VIRTUAL VISIT (OUTPATIENT)
Dept: FAMILY MEDICINE | Facility: OTHER | Age: 41
End: 2021-08-26
Payer: COMMERCIAL

## 2021-08-26 ENCOUNTER — NURSE TRIAGE (OUTPATIENT)
Dept: FAMILY MEDICINE | Facility: OTHER | Age: 41
End: 2021-08-26

## 2021-08-26 DIAGNOSIS — R53.83 FATIGUE, UNSPECIFIED TYPE: ICD-10-CM

## 2021-08-26 DIAGNOSIS — R50.9 FEVER, UNSPECIFIED FEVER CAUSE: Primary | ICD-10-CM

## 2021-08-26 PROCEDURE — 99213 OFFICE O/P EST LOW 20 MIN: CPT | Mod: 95 | Performed by: FAMILY MEDICINE

## 2021-08-26 NOTE — PROGRESS NOTES
Kirt is a 41 year old who is being evaluated via a billable video visit.      How would you like to obtain your AVS? MyChart  If the video visit is dropped, the invitation should be resent by: Text to cell phone: [phone]  Will anyone else be joining your video visit? No    Video Start Time: 3:00PM    Assessment & Plan     Fever, unspecified fever cause  Patient is an otherwise healthy 41-year-old who is evaluated through a virtual visit today to discuss symptoms of fever, extreme fatigue, shortness of breath since 1 week.  Wife was diagnosed with Covid 3 days ago.  He was last seen in the clinic about a week ago when he was completely asymptomatic and thinks he might have been exposed while he was seen in the clinic here and then carried at home to his wife who is immunocompromised.  He has not been tested yet.  I encouraged him to complete testing.  Will consider monoclonal antibody treatment if he is still in the window.  Encouraged to check oxygen saturations at home and to seek medical attention if they seem to be persistently below 95.  Discussed symptomatic treatment with Tylenol and ibuprofen.  Reportable signs and symptoms discussed.  - Symptomatic COVID-19 Virus (Coronavirus) by PCR; Future    Fatigue, unspecified type        Eileen Abbott MD  [facility]   Kirt is a 41 year old who presents for the following health issues     HPI     Vaccinated on Tuesday of last week. All he has had is a sore arm for a couple of days. Friday night he had a temperature of 103 and it comes and goes. He is unsure how to beat it. His wife tested positive yesterday, he had to take her to the ER because she blacked out. They tested her at the hospital yesterday and there is nothing that they can do with her. He is having a hard time breathing.     Concern for COVID-19  About how many days ago did these symptoms start? Last week Friday.   Is this your first visit for this illness?  Yes  In the 14 days before your symptoms started, have you had close contact with someone with COVID-19 (Coronavirus)? No - see above.   Do you have a fever or chills? Yes, the highest temperature was 103  Are you having new or worsening difficulty breathing? Yes   Please describe what kind of difficulty you are having breathing:  Do you have new or worsening cough? Yes, I am coughing up mucus.  Have you had any new or unexplained body aches? YES  - lower back hurts like he broke his back.   Have you experienced any of the following NEW symptoms?    Headache: YES    Sore throat: YES    Loss of taste or smell: YES - he cannot taste anything, has had to breathe through his mouth. Hurts to breathe through his nose.     Chest pain: YES - yesterday and last night around midnight, he had to sit up thought he was going to black out but he concentrated on his breathing and then it went away.     Diarrhea: YES    Rash: YES - between in his legs. Armpits.   What treatments have you tried? Monkey butt powder so it doesn't hurt when he moves his arms, Nyquil, Ibuprofen  Who do you live with? Wife and two kids.   Are you, or a household member, a healthcare worker or a ? No  Do you live in a nursing home, group home, or shelter? No  Do you have a way to get food/medications if quarantined? Yes, I have a friend or family member who can help me.      Review of Systems   Constitutional, HEENT, cardiovascular, pulmonary, gi and gu systems are negative, except as otherwise noted.      Objective           Vitals:  No vitals were obtained today due to virtual visit.    Physical Exam   GENERAL: Healthy, alert and no distress  EYES: Eyes grossly normal to inspection.  No discharge or erythema, or obvious scleral/conjunctival abnormalities.  RESP: No audible wheeze, cough, or visible cyanosis.  No visible retractions or increased work of breathing.    SKIN: Visible skin clear. No significant rash, abnormal pigmentation or  lesions.  NEURO: Cranial nerves grossly intact.  Mentation and speech appropriate for age.  PSYCH: Mentation appears normal, affect normal/bright, judgement and insight intact, normal speech and appearance well-groomed.          Video-Visit Details    Type of service:  Video Visit    Video End Time:3:17 PM    Originating Location (pt. Location): Home    Distant Location (provider location):  [facility]     Platform used for Video Visit: [employer]

## 2021-08-26 NOTE — TELEPHONE ENCOUNTER
Patient calling to discuss his breathing. During the conversation the patient sounded out of breathing during speaking. He had a visit with . Notes are not completed. The patient was told if his oxygen dropped to 94-95 to go to the emergency room. Patient was heading there with a .     HEATHER BrookeN, RN, PHN  [place]  August 26, 2021    Reason for Disposition    MODERATE difficulty breathing (e.g., speaks in phrases, SOB even at rest, pulse 100-120) of new onset or worse than normal    Additional Information    Negative: Breathing stopped and hasn't returned    Negative: Choking on something    Negative: SEVERE difficulty breathing (e.g., struggling for each breath, speaks in single words, pulse > 120)    Negative: Bluish (or gray) lips or face    Negative: Difficult to awaken or acting confused (e.g., disoriented, slurred speech)    Negative: Passed out (i.e., fainted, collapsed and was not responding)    Negative: Wheezing started suddenly after medicine, an allergic food, or bee sting    Negative: Stridor    Negative: Slow, shallow and weak breathing    Negative: Sounds like a life-threatening emergency to the triager    Negative: Chest pain    Negative: Wheezing (high pitched whistling sound) and previous asthma attacks or use of asthma medicines    Negative: Difficulty breathing and only present when coughing    Negative: Difficulty breathing and only from stuffy or runny nose    Negative: Difficulty breathing and within 14 days of COVID-19 Exposure    Protocols used: BREATHING DIFFICULTY-A-OH

## 2021-08-30 ENCOUNTER — MYC MEDICAL ADVICE (OUTPATIENT)
Dept: FAMILY MEDICINE | Facility: OTHER | Age: 41
End: 2021-08-30

## 2021-08-31 ENCOUNTER — MYC MEDICAL ADVICE (OUTPATIENT)
Dept: FAMILY MEDICINE | Facility: OTHER | Age: 41
End: 2021-08-31

## 2021-08-31 DIAGNOSIS — U07.1 2019 NOVEL CORONAVIRUS DISEASE (COVID-19): ICD-10-CM

## 2021-08-31 DIAGNOSIS — R06.02 SHORTNESS OF BREATH: Primary | ICD-10-CM

## 2021-09-02 RX ORDER — ALBUTEROL SULFATE 90 UG/1
2 AEROSOL, METERED RESPIRATORY (INHALATION) EVERY 4 HOURS PRN
Qty: 18 G | Refills: 1 | Status: SHIPPED | OUTPATIENT
Start: 2021-09-02 | End: 2022-05-25

## 2021-09-12 ENCOUNTER — HEALTH MAINTENANCE LETTER (OUTPATIENT)
Age: 41
End: 2021-09-12

## 2021-11-18 ENCOUNTER — IMMUNIZATION (OUTPATIENT)
Dept: PEDIATRICS | Facility: OTHER | Age: 41
End: 2021-11-18
Attending: FAMILY MEDICINE
Payer: COMMERCIAL

## 2021-11-18 PROCEDURE — 0002A PR COVID VAC PFIZER DIL RECON 30 MCG/0.3 ML IM: CPT

## 2021-11-18 PROCEDURE — 91300 PR COVID VAC PFIZER DIL RECON 30 MCG/0.3 ML IM: CPT

## 2022-01-05 ENCOUNTER — MYC MEDICAL ADVICE (OUTPATIENT)
Dept: FAMILY MEDICINE | Facility: OTHER | Age: 42
End: 2022-01-05
Payer: COMMERCIAL

## 2022-05-23 NOTE — PROGRESS NOTES
ENT Consultation    Kirt Burt who is a 41 year old male seen in consultation at the request of Milena      History of Present Illness - Kirt Burt is a 41 year old male ringing in ears     Patient has been experiencing nonpulsatile tinnitus-high-frequency for at least 5 years.  He also believes that his hearing has declined.  He especially can appreciated with background noise environment.  He does endorse history of vertigo that he has had for many years.  It used to be longer lasting.  He was taking meclizine with good efficacy.  Lately he gets it for about an hour from time to time goes away.  He feels that stress brings on the vertigo brings on the headaches with photophobia.  He has history of migraines more typical migraines.  Denies any otalgia.  Denies any history of ear infections.  Body mass index is 38.23 kg/m .    Weight management plan: Patient was referred to their PCP to discuss a diet and exercise plan.    BP Readings from Last 1 Encounters:   05/25/22 122/82       BP noted to be well controlled today in office.     Kirt IS NOT a smoker/uses chewing tobacco.      Past Medical History -   Past Medical History:   Diagnosis Date     NO ACTIVE PROBLEMS        Current Medications -   Current Outpatient Medications:      ALPRAZolam (XANAX) 0.25 MG tablet, Take 1-2 tablets (0.25-0.5 mg) by mouth 3 times daily as needed for anxiety, Disp: 10 tablet, Rfl: 1     Digestive Enzymes CAPS, , Disp: , Rfl:      meclizine (ANTIVERT) 25 MG tablet, Take 1 tablet (25 mg) by mouth 3 times daily as needed for dizziness, Disp: 30 tablet, Rfl: 3     Probiotic Product (PRO-BIOTIC BLEND PO), , Disp: , Rfl:     Allergies - No Known Allergies    Social History -   Social History     Socioeconomic History     Marital status:      Number of children: 2   Occupational History     Employer: [employer]   Tobacco Use     Smoking status: Never Smoker     Smokeless tobacco: Never Used  "  Vaping Use     Vaping Use: Never used   Substance and Sexual Activity     Alcohol use: Yes     Comment: rare     Drug use: No     Sexual activity: Yes     Partners: Female     Birth control/protection: Condom   Other Topics Concern     Parent/sibling w/ CABG, MI or angioplasty before 65F 55M? No       Family History -   Family History   Problem Relation Age of Onset     Hypertension Mother      Thyroid Disease Mother         Thyroidectomy-CA     Diabetes Mother      C.A.D. Maternal Grandmother         hx of bypass     Family History Negative No family hx of        Review of Systems - As per HPI and PMHx, otherwise review of system review of the head and neck negative. Otherwise 10+ review of system is negative    Physical Exam  /82   Temp 97.5  F (36.4  C) (Temporal)   Ht 1.816 m (5' 11.5\")   Wt 126.1 kg (278 lb)   BMI 38.23 kg/m    BMI: Body mass index is 38.23 kg/m .    General - The patient is well nourished and well developed, and appears to have good nutritional status.  Alert and oriented to person and place, answers questions and cooperates with examination appropriately.    SKIN - No suspicious lesions or rashes.  Respiration - No respiratory distress.  Head and Face - Normocephalic and atraumatic, with no gross asymmetry noted of the contour of the facial features.  The facial nerve is intact, with strong symmetric movements.    Voice and Breathing - The patient was breathing comfortably without the use of accessory muscles. The patients voice was clear and strong, and had appropriate pitch and quality.    Ears - Bilateral pinna and EACs with normal appearing overlying skin. Tympanic membrane intact with good mobility on pneumatic otoscopy bilaterally. Bony landmarks of the ossicular chain are normal. The tympanic membranes are normal in appearance. No retraction, perforation, or masses.  No fluid or purulence was seen in the external canal or the middle ear.     Eyes - Extraocular movements " intact.  Sclera were not icteric or injected, conjunctiva were pink and moist.    Mouth - Examination of the oral cavity showed pink, healthy oral mucosa. No lesions or ulcerations noted.  The tongue was mobile and midline, and the dentition were in good condition.      Throat - The walls of the oropharynx were smooth, pink, moist, symmetric, and had no lesions or ulcerations.  The tonsillar pillars and soft palate were symmetric.  The uvula was midline on elevation.    Neck - Normal midline excursion of the laryngotracheal complex during swallowing.  Full range of motion on passive movement.  Palpation of the occipital, submental, submandibular, internal jugular chain, and supraclavicular nodes did not demonstrate any abnormal lymph nodes or masses.  The carotid pulse was palpable bilaterally.  Palpation of the thyroid was soft and smooth, with no nodules or goiter appreciated.  The trachea was mobile and midline.    Nose - External contour is symmetric, no gross deflection or scars.  Nasal mucosa is pink and moist with no abnormal mucus.  The septum was midline and non-obstructive, turbinates of normal size and position.  No polyps, masses, or purulence noted on examination.    Neuro - Nonfocal neuro exam is normal, CN 2 through 12 intact, normal gait and muscle tone.      Performed in clinic today:  Audiologic Studies - An audiogram and tympanogram were performed today as part of the evaluation and personally reviewed. The tympanogram shows Type A curves on the right and Type A curves on the left, with Normal canal volumes and middle ear pressures.  The audiogram showed Mild to moderate SNHL on the right and Mild to moderate SNHLon the left.    Word recognition score 92% on the right and 100% on the left.    A/P - Kirt Burt is a 41 year old male with bilateral sensorineural hearing loss quite symmetrical.  We discussed of course hearing protection and possibly considering amplification.  In the past he was  working in a very noisy environment not so much now.  He is very interested in exploring amplification.  We also discussed tinnitus management basically tinnitus management strategies with avoidance of caffeine salt dark chocolate alcohol.  Some of those dietary components can also bring on migraine.  I believe his other symptoms may be a migraine related such as vertigo.  He will also use white noise masking for tinnitus as well as proper antioxidant supplements.  Patient will follow-up in a year with audiogram.  Today in total 45 minutes was spent with the patient mostly in counseling consulting regarding approaching migraines approaching vertigo tinnitus and hearing loss.      Bassam Lester MD

## 2022-05-25 ENCOUNTER — OFFICE VISIT (OUTPATIENT)
Dept: AUDIOLOGY | Facility: OTHER | Age: 42
End: 2022-05-25
Payer: COMMERCIAL

## 2022-05-25 ENCOUNTER — OFFICE VISIT (OUTPATIENT)
Dept: OTOLARYNGOLOGY | Facility: OTHER | Age: 42
End: 2022-05-25
Payer: COMMERCIAL

## 2022-05-25 VITALS
WEIGHT: 278 LBS | BODY MASS INDEX: 37.65 KG/M2 | TEMPERATURE: 97.5 F | HEIGHT: 72 IN | DIASTOLIC BLOOD PRESSURE: 82 MMHG | SYSTOLIC BLOOD PRESSURE: 122 MMHG

## 2022-05-25 DIAGNOSIS — G43.001 MIGRAINE WITHOUT AURA AND WITH STATUS MIGRAINOSUS, NOT INTRACTABLE: Primary | ICD-10-CM

## 2022-05-25 DIAGNOSIS — R42 DIZZINESS: ICD-10-CM

## 2022-05-25 DIAGNOSIS — H90.3 SENSORINEURAL HEARING LOSS, BILATERAL: Primary | ICD-10-CM

## 2022-05-25 DIAGNOSIS — H90.3 SENSORINEURAL HEARING LOSS, BILATERAL: ICD-10-CM

## 2022-05-25 DIAGNOSIS — H93.13 TINNITUS OF BOTH EARS: ICD-10-CM

## 2022-05-25 DIAGNOSIS — H93.13 TINNITUS, BILATERAL: ICD-10-CM

## 2022-05-25 DIAGNOSIS — R42 VERTIGO: ICD-10-CM

## 2022-05-25 PROCEDURE — 92557 COMPREHENSIVE HEARING TEST: CPT | Performed by: AUDIOLOGIST

## 2022-05-25 PROCEDURE — 92550 TYMPANOMETRY & REFLEX THRESH: CPT | Performed by: AUDIOLOGIST

## 2022-05-25 PROCEDURE — 99204 OFFICE O/P NEW MOD 45 MIN: CPT | Performed by: OTOLARYNGOLOGY

## 2022-05-25 PROCEDURE — 99207 PR NO CHARGE LOS: CPT | Performed by: AUDIOLOGIST

## 2022-05-25 ASSESSMENT — PAIN SCALES - GENERAL: PAINLEVEL: NO PAIN (0)

## 2022-05-25 NOTE — LETTER
5/25/2022         RE: Kirt Burt  [address] [place] [address]        Dear Colleague,    Thank you for referring your patient, Kirt Burt, to the [facility]. Please see a copy of my visit note below.    ENT Consultation    Kirt Burt who is a 41 year old male seen in consultation at the request of Milena      History of Present Illness - Kirt Burt is a 41 year old male ringing in ears     Patient has been experiencing nonpulsatile tinnitus-high-frequency for at least 5 years.  He also believes that his hearing has declined.  He especially can appreciated with background noise environment.  He does endorse history of vertigo that he has had for many years.  It used to be longer lasting.  He was taking meclizine with good efficacy.  Lately he gets it for about an hour from time to time goes away.  He feels that stress brings on the vertigo brings on the headaches with photophobia.  He has history of migraines more typical migraines.  Denies any otalgia.  Denies any history of ear infections.  Body mass index is 38.23 kg/m .    Weight management plan: Patient was referred to their PCP to discuss a diet and exercise plan.    BP Readings from Last 1 Encounters:   05/25/22 122/82       BP noted to be well controlled today in office.     Kirt IS NOT a smoker/uses chewing tobacco.      Past Medical History -   Past Medical History:   Diagnosis Date     NO ACTIVE PROBLEMS        Current Medications -   Current Outpatient Medications:      ALPRAZolam (XANAX) 0.25 MG tablet, Take 1-2 tablets (0.25-0.5 mg) by mouth 3 times daily as needed for anxiety, Disp: 10 tablet, Rfl: 1     Digestive Enzymes CAPS, , Disp: , Rfl:      meclizine (ANTIVERT) 25 MG tablet, Take 1 tablet (25 mg) by mouth 3 times daily as needed for dizziness, Disp: 30 tablet, Rfl: 3     Probiotic Product (PRO-BIOTIC BLEND PO), , Disp: , Rfl:     Allergies - No Known Allergies    Social History  "-   Social History     Socioeconomic History     Marital status:      Number of children: 2   Occupational History     Employer: [employer]   Tobacco Use     Smoking status: Never Smoker     Smokeless tobacco: Never Used   Vaping Use     Vaping Use: Never used   Substance and Sexual Activity     Alcohol use: Yes     Comment: rare     Drug use: No     Sexual activity: Yes     Partners: Female     Birth control/protection: Condom   Other Topics Concern     Parent/sibling w/ CABG, MI or angioplasty before 65F 55M? No       Family History -   Family History   Problem Relation Age of Onset     Hypertension Mother      Thyroid Disease Mother         Thyroidectomy-CA     Diabetes Mother      C.A.D. Maternal Grandmother         hx of bypass     Family History Negative No family hx of        Review of Systems - As per HPI and PMHx, otherwise review of system review of the head and neck negative. Otherwise 10+ review of system is negative    Physical Exam  /82   Temp 97.5  F (36.4  C) (Temporal)   Ht 1.816 m (5' 11.5\")   Wt 126.1 kg (278 lb)   BMI 38.23 kg/m    BMI: Body mass index is 38.23 kg/m .    General - The patient is well nourished and well developed, and appears to have good nutritional status.  Alert and oriented to person and place, answers questions and cooperates with examination appropriately.    SKIN - No suspicious lesions or rashes.  Respiration - No respiratory distress.  Head and Face - Normocephalic and atraumatic, with no gross asymmetry noted of the contour of the facial features.  The facial nerve is intact, with strong symmetric movements.    Voice and Breathing - The patient was breathing comfortably without the use of accessory muscles. The patients voice was clear and strong, and had appropriate pitch and quality.    Ears - Bilateral pinna and EACs with normal appearing overlying skin. Tympanic membrane intact with good mobility on pneumatic otoscopy " bilaterally. Bony landmarks of the ossicular chain are normal. The tympanic membranes are normal in appearance. No retraction, perforation, or masses.  No fluid or purulence was seen in the external canal or the middle ear.     Eyes - Extraocular movements intact.  Sclera were not icteric or injected, conjunctiva were pink and moist.    Mouth - Examination of the oral cavity showed pink, healthy oral mucosa. No lesions or ulcerations noted.  The tongue was mobile and midline, and the dentition were in good condition.      Throat - The walls of the oropharynx were smooth, pink, moist, symmetric, and had no lesions or ulcerations.  The tonsillar pillars and soft palate were symmetric.  The uvula was midline on elevation.    Neck - Normal midline excursion of the laryngotracheal complex during swallowing.  Full range of motion on passive movement.  Palpation of the occipital, submental, submandibular, internal jugular chain, and supraclavicular nodes did not demonstrate any abnormal lymph nodes or masses.  The carotid pulse was palpable bilaterally.  Palpation of the thyroid was soft and smooth, with no nodules or goiter appreciated.  The trachea was mobile and midline.    Nose - External contour is symmetric, no gross deflection or scars.  Nasal mucosa is pink and moist with no abnormal mucus.  The septum was midline and non-obstructive, turbinates of normal size and position.  No polyps, masses, or purulence noted on examination.    Neuro - Nonfocal neuro exam is normal, CN 2 through 12 intact, normal gait and muscle tone.      Performed in clinic today:  Audiologic Studies - An audiogram and tympanogram were performed today as part of the evaluation and personally reviewed. The tympanogram shows Type A curves on the right and Type A curves on the left, with Normal canal volumes and middle ear pressures.  The audiogram showed Mild to moderate SNHL on the right and Mild to moderate SNHLon the left.    Word recognition  score 92% on the right and 100% on the left.    A/P - Kirt Burt is a 41 year old male with bilateral sensorineural hearing loss quite symmetrical.  We discussed of course hearing protection and possibly considering amplification.  In the past he was working in a very noisy environment not so much now.  He is very interested in exploring amplification.  We also discussed tinnitus management basically tinnitus management strategies with avoidance of caffeine salt dark chocolate alcohol.  Some of those dietary components can also bring on migraine.  I believe his other symptoms may be a migraine related such as vertigo.  He will also use white noise masking for tinnitus as well as proper antioxidant supplements.  Patient will follow-up in a year with audiogram.  Today in total 45 minutes was spent with the patient mostly in counseling consulting regarding approaching migraines approaching vertigo tinnitus and hearing loss.      Bassam Lester MD        Again, thank you for allowing me to participate in the care of your patient.        Sincerely,        Bassam Lester MD, MD

## 2022-05-25 NOTE — PROGRESS NOTES
AUDIOLOGY REPORT:    Patient was referred from ENT by Dr. Lester for audiology evaluation. The patient reports bilateral tinnitus that has been present for years and has recently been getting worse. The patient also reports that he has been having more difficulty understanding speech, especially in background noise, and he finds he is reading lips more. The patient also reports intermittent episodes of dizziness that have been occurring for years. He reports an occasional sensation of ear pressure or water in his ears, but he does not have ear pain. The patient reports a history of occupational noise exposure from loud machines, with hearing protection. The patient reports a family history of hearing loss attributed to noise exposure. The patient reports that he does not have a history of ear problems or ear surgery. The patient was accompanied to the appointment by his wife.    Testing:    Otoscopy:   Otoscopic exam indicates ears are clear of cerumen bilaterally     Tympanograms:    RIGHT: normal eardrum mobility     LEFT:   normal eardrum mobility    Reflexes (reported by stimulus ear):  RIGHT: Ipsilateral is present at normal levels  RIGHT: Contralateral is present at normal levels  LEFT:   Ipsilateral is present at normal levels  LEFT:   Contralateral is present at normal levels    Thresholds:   Pure Tone Thresholds assessed using conventional audiometry with good reliability from 250-8000 Hz bilaterally using insert earphones and circumaural headphones     RIGHT:  normal hearing sensitivity through 500 Hz sloping to mild to moderately severe sensorineural hearing loss    LEFT:    normal hearing sensitivity through 1000 Hz sloping to mild to moderate sensorineural hearing loss    Speech Reception Threshold:    RIGHT: 35 dB HL    LEFT:   30 dB HL  Results are in agreement with pure tone average.     Word Recognition Score:     RIGHT: 92% at 75 dB HL using NU-6 recorded word list.    LEFT:   100% at 70 dB HL  using NU-6 recorded word list.    Discussed results with the patient.     Patient was returned to ENT for follow up.     Jamal Gibson, CCC-A  Licensed Audiologist #[id number]  5/25/2022

## 2022-06-21 ENCOUNTER — ANCILLARY PROCEDURE (OUTPATIENT)
Dept: MRI IMAGING | Facility: CLINIC | Age: 42
End: 2022-06-21
Attending: SURGERY
Payer: COMMERCIAL

## 2022-06-21 DIAGNOSIS — M79.89 SOFT TISSUE MASS: ICD-10-CM

## 2022-06-21 PROCEDURE — A9585 GADOBUTROL INJECTION: HCPCS | Performed by: RADIOLOGY

## 2022-06-21 PROCEDURE — 72197 MRI PELVIS W/O & W/DYE: CPT | Performed by: RADIOLOGY

## 2022-06-21 RX ORDER — GADOBUTROL 604.72 MG/ML
7.5 INJECTION INTRAVENOUS ONCE
Status: COMPLETED | OUTPATIENT
Start: 2022-06-21 | End: 2022-06-21

## 2022-06-21 RX ADMIN — GADOBUTROL 5.5 ML: 604.72 INJECTION INTRAVENOUS at 12:08

## 2022-06-21 RX ADMIN — GADOBUTROL 7.5 ML: 604.72 INJECTION INTRAVENOUS at 12:06

## 2022-07-05 ENCOUNTER — TELEPHONE (OUTPATIENT)
Dept: SURGERY | Facility: CLINIC | Age: 42
End: 2022-07-05

## 2022-08-23 NOTE — TELEPHONE ENCOUNTER
DIAGNOSIS: L sided Glute Mass, per pt ref. by Daniel Doll MD  , MRI & CT in system   APPOINTMENT DATE: 08/31/2022   NOTES STATUS DETAILS   OFFICE NOTE from referring provider Internal 02/24/2021 - Daniel Doll MD - [facility]    MEDICATION LIST Internal    LABS     CBC/DIFF Care Everywhere 08/26/2021   MRI PACS 06/21/2022 - Pelvic Bones

## 2022-08-31 ENCOUNTER — PRE VISIT (OUTPATIENT)
Dept: ORTHOPEDICS | Facility: CLINIC | Age: 42
End: 2022-08-31

## 2022-09-07 ENCOUNTER — OFFICE VISIT (OUTPATIENT)
Dept: ORTHOPEDICS | Facility: CLINIC | Age: 42
End: 2022-09-07
Payer: COMMERCIAL

## 2022-09-07 VITALS — BODY MASS INDEX: 39.48 KG/M2 | WEIGHT: 282 LBS | HEIGHT: 71 IN

## 2022-09-07 DIAGNOSIS — D17.9 INTRAMUSCULAR LIPOMA: Primary | ICD-10-CM

## 2022-09-07 PROCEDURE — 99204 OFFICE O/P NEW MOD 45 MIN: CPT | Performed by: ORTHOPAEDIC SURGERY

## 2022-09-07 ASSESSMENT — ENCOUNTER SYMPTOMS
INSOMNIA: 1
DEPRESSION: 0
NERVOUS/ANXIOUS: 1
DECREASED CONCENTRATION: 1
PANIC: 1

## 2022-09-07 NOTE — LETTER
9/7/2022         RE: Kirt Burt  [address] [place] [address]        Dear Colleague,    Thank you for referring your patient, Kirt Burt, to the [facility] [place]. Please see a copy of my visit note below.    This 42-year-old had a CT scan 19 months ago to evaluate diverticulitis and a mass was noticed in the hip.  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  Prior to the scan 18 months ago he did not notice it much but since injuring his back it has become more noticeable as he has some soreness in this area and its prominent when he sits.    On examination he is alert oriented has normal mood and affect and is in no acute distress peer respirations are regular and unlabored eyes are nonicteric.  The patient is able to ambulate today without a noticeable limp.  He has some fullness in the left hip area.    I reviewed the MRI which shows an intramuscular lipoma without any sign of malignant changes.  It is about the same size as is described in the report of his CT scan from February 2021.    Our plan is to do a marginal excision under general anesthesia.  The patient understands risks of bleed infection pain numbness tingling limp.  I have answered all of his questions today.          Surjit Cavanaugh MD

## 2022-09-07 NOTE — NURSING NOTE
Teaching Flowsheet   Relevant Diagnosis: Excision left hip mass       Teaching Topic: preop     Person(s) involved in teaching:   Patient and Wife     Motivation Level:  Asks Questions: Yes  Eager to Learn: Yes  Cooperative: Yes  Receptive (willing/able to accept information): Yes  Any cultural factors/Synagogue beliefs that may influence understanding or compliance? No       Patient and Family demonstrates understanding of the following:  Reason for the appointment, diagnosis and treatment plan: Yes  Knowledge of proper use of medications and conditions for which they are ordered (with special attention to potential side effects or drug interactions): Yes  Which situations necessitate calling provider and whom to contact: Yes            Proper use and care of soap (medical equip, care aids, etc.): Yes  Nutritional needs and diet plan: Yes  Pain management techniques: Yes  Wound Care: Yes  How and/when to access community resources: Yes     Instructional Materials Used/Given: surgery packet reviewed with patient and his wife.  He will do H&P with PCP.  His wife will take him home after his same day surgery.  covid requirements discussed.  No further questions at this time.  They will await Sondio

## 2022-09-07 NOTE — PROGRESS NOTES
This 42-year-old had a CT scan 19 months ago to evaluate diverticulitis and a mass was noticed in the hip.  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  Prior to the scan 18 months ago he did not notice it much but since injuring his back it has become more noticeable as he has some soreness in this area and its prominent when he sits.    On examination he is alert oriented has normal mood and affect and is in no acute distress peer respirations are regular and unlabored eyes are nonicteric.  The patient is able to ambulate today without a noticeable limp.  He has some fullness in the left hip area.    I reviewed the MRI which shows an intramuscular lipoma without any sign of malignant changes.  It is about the same size as is described in the report of his CT scan from February 2021.    Our plan is to do a marginal excision under general anesthesia.  The patient understands risks of bleed infection pain numbness tingling limp.  I have answered all of his questions today.

## 2022-09-07 NOTE — NURSING NOTE
"Reason For Visit:   Chief Complaint   Patient presents with     Consult     Left gluteal mass referre by Dr. Doll // first noticed over a year        Ht 1.81 m (5' 11.26\")   Wt 127.9 kg (282 lb)   BMI 39.04 kg/m      Pain Assessment  Patient Currently in Pain: Yes  0-10 Pain Scale: 1  Primary Pain Location:  (left buttock/lower back area)      Jame Kaur ATC    "

## 2022-09-08 ENCOUNTER — TELEPHONE (OUTPATIENT)
Dept: ORTHOPEDICS | Facility: CLINIC | Age: 42
End: 2022-09-08

## 2022-09-08 PROBLEM — D17.9 INTRAMUSCULAR LIPOMA: Status: ACTIVE | Noted: 2022-09-08

## 2022-09-08 NOTE — TELEPHONE ENCOUNTER
Patient is scheduled for surgery with Dr. Cavanaugh    Spoke with: Kirt    Date of Surgery: 11/03/22    Location: [facility]    Informed patient they will need an adult  Yes    H&P: Scheduled with PCP    Pre-procedure COVID-19 Test: Patient will complete at home    Additional imaging/appointments: POP made    Surgery packet: Received in clinic     Additional comments: N/A

## 2022-09-08 NOTE — TELEPHONE ENCOUNTER
Phoned patient to get him scheduled for surgery with Dr. Cavanaugh     Patient was unavailable,   Provided call back number in voicemail:   [phone].

## 2022-10-28 ENCOUNTER — TELEPHONE (OUTPATIENT)
Dept: ORTHOPEDICS | Facility: CLINIC | Age: 42
End: 2022-10-28

## 2022-10-28 NOTE — TELEPHONE ENCOUNTER
FUTURE VISIT INFORMATION      SURGERY INFORMATION:    Date: 11/3/22    [facility]    Surgeon:  Surjit Cavanaugh MD    Anesthesia Type:  general    Procedure: Excision left hip mass    RECORDS REQUESTED FROM:       Primary Care Provider: [facility]    Most recent EKG+ Tracin12

## 2022-10-28 NOTE — TELEPHONE ENCOUNTER
Phoned patient after caller received a staff MSG that patient had not completed his H&P with PCP.    Phoned patient,   He stated that he had not had been able to scheduled it.     In this encounter patient was scheduled with PAC for 10/31/22.  Went over COVID testing.     Patient had no further questions or concerns.

## 2022-10-31 ENCOUNTER — PRE VISIT (OUTPATIENT)
Dept: SURGERY | Facility: CLINIC | Age: 42
End: 2022-10-31

## 2022-10-31 ENCOUNTER — VIRTUAL VISIT (OUTPATIENT)
Dept: SURGERY | Facility: CLINIC | Age: 42
End: 2022-10-31
Payer: COMMERCIAL

## 2022-10-31 ENCOUNTER — ANESTHESIA EVENT (OUTPATIENT)
Dept: SURGERY | Facility: AMBULATORY SURGERY CENTER | Age: 42
End: 2022-10-31
Payer: COMMERCIAL

## 2022-10-31 DIAGNOSIS — Z01.818 PREOP EXAMINATION: Primary | ICD-10-CM

## 2022-10-31 PROCEDURE — 99203 OFFICE O/P NEW LOW 30 MIN: CPT | Mod: 95 | Performed by: PHYSICIAN ASSISTANT

## 2022-10-31 ASSESSMENT — LIFESTYLE VARIABLES: TOBACCO_USE: 0

## 2022-10-31 ASSESSMENT — ENCOUNTER SYMPTOMS: SEIZURES: 0

## 2022-10-31 ASSESSMENT — PAIN SCALES - GENERAL: PAINLEVEL: NO PAIN (0)

## 2022-10-31 NOTE — PATIENT INSTRUCTIONS
Preparing for Your Surgery      Name:  Kirt Burt   MRN:  [id number]   :  1980   Today's Date:  10/31/2022         Arriving for surgery:  Surgery date:  22  Arrival time:  12:00 pm    Restrictions due to COVID 19:    Effective 2022:  2 visitors may accompany patient and wait in the Surgery Waiting Room.  All visitors must wear a mask and social distance.      parking is available for anyone with mobility limitations or disabilities. (Monday- Friday 7 am- 5 pm)    Please come to:    [facility]  [address] [place] [id number]    Please check in on the 5th floor at the Ambulatory Surgery Center.      What can I eat or drink?    -  You may eat and drink normally until 8 hours prior to surgery time. (Until 5 am)  -  You may have clear liquids until 4 hours prior to surgery time. (Until 9:30 am)    Examples of clear liquids:  Water  Clear broth  Juices (apple, white grape, white cranberry  and cider) without pulp  Noncarbonated, powder based beverages  (lemonade and Alonzo-Aid)  Sodas (Sprite, 7-Up, ginger ale and seltzer)  Coffee or tea (without milk or cream)  Gatorade    --No alcohol for at least 24 hours before surgery.    Which medicines can I take?    Hold Aspirin for 7 days before surgery.   Hold Multivitamins for 7 days before surgery.  Hold Supplements for 7 days before surgery.  Hold Ibuprofen (Advil, Motrin) for 1 day before surgery--unless otherwise directed by surgeon.  Hold Naproxen (Aleve) for 4 days before surgery.      -  DO NOT take the following medications the day of surgery:  Vitamin C  Digestive enzymes  Probiotic       -  PLEASE TAKE the following medications the day of surgery:   Alprazolam if needed  Meclizine if needed      How do I prepare myself?  - Please take 2 showers before surgery using Scrubcare or Hibiclens soap.    Use this soap only from the neck to your toes.     Leave the soap on your skin for one minute--then rinse  thoroughly.      You may use your own shampoo and conditioner. No other hair products.   - Please remove all jewelry and body piercings.  - No lotions, deodorants or fragrance.  - No makeup or fingernail polish.   - Bring your ID and insurance card.    -If you have a Deep Brain Stimulator, a Spinal Cord Stimulator, or any implanted Neuro Device, you must bring the remote to the Surgery Center.         ALL PATIENTS ARE REQUIRED TO HAVE A RESPONSIBLE ADULT TO DRIVE AND BE IN ATTENDANCE WITH THEM FOR 24 HOURS FOLLOWING SURGERY.       Questions or Concerns:    -For questions regarding the day of surgery, please contact the Ambulatory Surgery Center at [phone].    -If you have health changes between today and your surgery, please contact your surgeon.     - For questions after surgery, please contact your surgeon's office.

## 2022-10-31 NOTE — H&P (VIEW-ONLY)
Pre-Operative H & P     CC:  Preoperative exam to assess for increased cardiopulmonary risk while undergoing surgery and anesthesia.    Date of Encounter: 10/31/2022  Primary Care Physician:  Eileen Abbott     Reason for Visit: Intramuscular lipoma    HPI  Kirt Burt is a 42 year old male who presents for pre-operative H & P in preparation for  Procedure Information     Case: [id number] Date/Time: 11/03/22 1325    Procedure: Excision left hip mass (Left: Leg)    Anesthesia type: General    Diagnosis: Intramuscular lipoma [D17.9]    Pre-op diagnosis: Intramuscular lipoma [D17.9]    Location: [place] [address] / [facility] [place]    Providers: Surjit Cavanaugh MD          Patient is being evaluated for comorbid conditions of morbid obesity, anxiety.    Mr. Burt had a CT scan 19 months ago to evaluate diverticulitis and a mass was noticed in the hip.  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  Prior to the scan 18 months ago he did not notice it much but since injuring his back it has become more noticeable as he has some soreness in this area and its prominent when he sits. He now presents for the above procedure.    History was obtained from patient & chart review.     Hx of abnormal bleeding or anti-platelet use: denies      Past Medical History  Past Medical History:   Diagnosis Date     Diverticulitis of colon      Generalized anxiety disorder      Intramuscular lipoma      Morbid obesity (H)        Past Surgical History  Past Surgical History:   Procedure Laterality Date     ARTHROSCOPY KNEE WITH MEDIAL MENISCECTOMY  5/7/2014    Procedure: ARTHROSCOPY KNEE WITH MEDIAL MENISCECTOMY;  Surgeon: Artis Dougherty MD;  [facility]     COLONOSCOPY N/A 3/30/2021    Procedure: Colonoscopy, With Polypectomy And Biopsy;  Surgeon: Daniel Doll MD;  Location: [facility]     COLONOSCOPY WITH CO2  INSUFFLATION N/A 3/30/2021    Procedure: COLONOSCOPY, WITH CO2 INSUFFLATION;  Surgeon: Daniel Doll MD;  Location: [facility]     NO HISTORY OF SURGERY         Prior to Admission Medications  Current Outpatient Medications   Medication Sig Dispense Refill     ALPRAZolam (XANAX) 0.25 MG tablet Take 1-2 tablets (0.25-0.5 mg) by mouth 3 times daily as needed for anxiety 10 tablet 1     Bioflavonoid Products (VITAMIN C) CHEW Take by mouth every morning       Digestive Enzymes CAPS Take by mouth as needed       meclizine (ANTIVERT) 25 MG tablet Take 1 tablet (25 mg) by mouth 3 times daily as needed for dizziness 30 tablet 3     Probiotic Product (PRO-BIOTIC BLEND PO) Take by mouth as needed         Allergies  No Known Allergies    Social History  Social History     Socioeconomic History     Marital status:      Spouse name: Not on file     Number of children: 2     Years of education: Not on file     Highest education level: Not on file   Occupational History     Employer: [employer]   Tobacco Use     Smoking status: Never     Smokeless tobacco: Never   Vaping Use     Vaping Use: Never used   Substance and Sexual Activity     Alcohol use: Not Currently     Comment: rare     Drug use: Never     Sexual activity: Yes     Partners: Female     Birth control/protection: Condom   Other Topics Concern     Parent/sibling w/ CABG, MI or angioplasty before 65F 55M? No   Social History Narrative     Not on file     Social Determinants of Health     Financial Resource Strain: Not on file   Food Insecurity: Not on file   Transportation Needs: Not on file   Physical Activity: Not on file   Stress: Not on file   Social Connections: Not on file   Intimate Partner Violence: Not on file   Housing Stability: Not on file       Family History  Family History   Problem Relation Age of Onset     Hypertension Mother      Thyroid Disease Mother         Thyroidectomy-CA     Diabetes Mother      C.A.D. Maternal  Grandmother         hx of bypass     Family History Negative No family hx of      Anesthesia Reaction No family hx of      Deep Vein Thrombosis (DVT) No family hx of        Review of Systems  The complete review of systems is negative other than noted in the HPI or here.     Anesthesia Evaluation   Pt has had prior anesthetic.     No history of anesthetic complications       ROS/MED HX  ENT/Pulmonary:  - neg pulmonary ROS  (-) tobacco use, asthma and sleep apnea   Neurologic:  - neg neurologic ROS   (+) no peripheral neuropathy  (-) no seizures and no CVA   Cardiovascular:  - neg cardiovascular ROS     METS/Exercise Tolerance: 4 - Raking leaves, gardening    Hematologic:  - neg hematologic  ROS  (-) history of blood clots and history of blood transfusion   Musculoskeletal: Comment: Intramuscular lipoma, left hip      GI/Hepatic:  - neg GI/hepatic ROS  (-) GERD and liver disease   Renal/Genitourinary:  - neg Renal ROS  (-) renal disease   Endo:     (+) Obesity,  (-) Type II DM   Psychiatric/Substance Use:  - neg psychiatric ROS     Infectious Disease:  - neg infectious disease ROS     Malignancy:  - neg malignancy ROS     Other:  - neg other ROS          Virtual visit -  No vitals were obtained    Physical Exam  Constitutional: Awake, alert, cooperative, no apparent distress, and appears stated age.  HENT: Normocephalic  Respiratory: non labored breathing   Neurologic: Awake, alert, oriented to name, place and time.   Neuropsychiatric: Calm, cooperative. Normal affect.      Prior Labs/Diagnostic Studies   All labs and imaging personally reviewed     LABS 8/26/22  SODIUM 136 - 145 mmol/L 137    POTASSIUM 3.5 - 5.1 mmol/L 4.2    Comment: The specimen is mildly hemolyzed and this result may be increased by >10%.  Interpret with caution and redraw if clinically indicated.   CHLORIDE 98 - 107 mmol/L 100    CARBON DIOXIDE 21 - 32 mmol/L 27    BUN (UREA NITRO) 7 - 18 mg/dL 11    CREATININE 0.70 - 1.30 mg/dL 1.18    EST GFR  (CKD-EPI) >60 mL/min >60    EST GFR IF AFRICAN AM >60 mL/min >60    GLUCOSE 70 - 110 mg/dL 112 High     CALCIUM, SERUM 8.5 - 10.1 mg/dL 8.4 Low     ANION GAP 0.0 - 15.0 mmol/L 10.0          WBC 4.3 - 10.8 K/uL 3.9 Low     RBC 4.60 - 6.20 M/uL 5.86    HEMOGLOBIN 14.0 - 18.0 gm/dL 17.3    HEMATOCRIT 40.0 - 54.0 % 50.7    MCV 80 - 100 fl 87    MCH 27 - 33 pg 30    MCHC 33 - 36 gm/dL 34    RDW 11.5 - 14.5 % 13.2    PLATELET COUNT     Comment: Unable to report platelets. RN to recollect.   MPV     Comment: Unable to report platelets. RN to recollect.       MR pelvis without contrast 6/21/2022  Impression:  Fat-containing mass in the left buttock, which may represent  intramuscular lipoma. Size greater than 10 cm raises concern for  atypical lipomatous tumor. Recommend follow-up with orthopedic  oncology at [facility].      CT ABD/PELVIS W CONTRAST 2/7/21  IMPRESSION:   1.  Acute diverticulitis of the mid sigmoid colon with associated localized perforation. Free air seen within the upper pelvis with moderately extensive pericolonic inflammatory change. No free intraperitoneal fluid or abscess.   2.  Large lipoma of the left gluteus jose angel measuring up to 12.7 x 10 cm in size.   3.  Diffuse fatty infiltration of the liver.     The patient's records and results personally reviewed by this provider.     Patient states that he will complete a home COVID test for above procedure.      Assessment      Kirt Burt is a 42 year old male seen as a PAC referral for risk assessment and optimization for anesthesia.    Plan/Recommendations  Pt will be optimized for the proposed procedure.  See below for details on the assessment, risk, and preoperative recommendations    NEUROLOGY  - No history of TIA, CVA or seizure    -Post Op delirium risk factors:  No risk identified    ENT  - No current airway concerns.  Will need to be reassessed day of surgery.  Mallampati: Unable to assess  TM: Unable to assess    CARDIAC  -  "No history of CAD, Hypertension and Afib  - METS (Metabolic Equivalents)  Patient performs 4 or more METS exercise without symptoms            Total Score: 0      RCRI-Very low risk: Class 1 0.4% complication rate            Total Score: 0        PULMONARY  MARCO Medium Risk            Total Score: 3    MARCO: BMI over 35 kg/m2    MARCO: Neck Circum >16 in    MARCO: Male      - Denies asthma or inhaler use  - Tobacco History  {Refresh the note if updates are made Link to Tobacco History :[id number]}    History   Smoking Status     Never   Smokeless Tobacco     Never       GI  - Denies GERD  PONV Medium Risk  Total Score: 2           1 AN PONV: Patient is not a current smoker    1 AN PONV: Intended Post Op Opioids            ENDOCRINE    - BMI: Estimated body mass index is 39.04 kg/m  as calculated from the following:    Height as of 9/7/22: 1.81 m (5' 11.26\").    Weight as of 9/7/22: 127.9 kg (282 lb).  Obesity (BMI >30)  - No history of Diabetes Mellitus. A1c on 8/7/21 was 5.4    HEME  VTE Low Risk 0.5%            Total Score: 2    VTE: Male      - No history of abnormal bleeding or antiplatelet use.      MSK  Patient is NOT Frail            Total Score: 0      - Intramuscular lipoma, left hip      The patient is optimized for their procedure. AVS with information on surgery time/arrival time, meds and NPO status given by nursing staff. No further diagnostic testing indicated.    Please refer to the physical examination documented by the anesthesiologist in the anesthesia record on the day of surgery.    Final plan per anesthesiologist on day of surgery.    Video-Visit Details    Type of service:  Video Visit    Provider received verbal consent for a Video Visit from the patient? Yes    Video Start Time: 1416  Video End Time (time video stopped): 1422    Originating Location (pt. Location): Home    Distant Location (provider location): Off-site    Mode of Communication:  Video Conference via FetchBack  On the day of service: "     Prep time: 13 minutes  Visit time: 6 minutes  Documentation time: 10 minutes  ------------------------------------------  Total time: 29 minutes      Danielle Kenny PA-C  Preoperative Assessment Center  [facility]  Clinic and Surgery Center  Phone: [phone]  Fax: [phone]

## 2022-10-31 NOTE — PROGRESS NOTES
Kirt is a 42 year old who is being evaluated via a billable video visit.      How would you like to obtain your AVS? MyChart    HPI       Review of Systems         Objective    Vitals - Patient Reported  Pain Score: No Pain (0)        Physical Exam             PETRONA Sommer LPN

## 2022-10-31 NOTE — H&P
Pre-Operative H & P     CC:  Preoperative exam to assess for increased cardiopulmonary risk while undergoing surgery and anesthesia.    Date of Encounter: 10/31/2022  Primary Care Physician:  Eileen Abbott     Reason for Visit: Intramuscular lipoma    HPI  Kirt Burt is a 42 year old male who presents for pre-operative H & P in preparation for  Procedure Information     Case: [id number] Date/Time: 11/03/22 1325    Procedure: Excision left hip mass (Left: Leg)    Anesthesia type: General    Diagnosis: Intramuscular lipoma [D17.9]    Pre-op diagnosis: Intramuscular lipoma [D17.9]    Location: [place] [address] / [facility] [place]    Providers: Surjit Cavanaugh MD          Patient is being evaluated for comorbid conditions of morbid obesity, anxiety.    Mr. Burt had a CT scan 19 months ago to evaluate diverticulitis and a mass was noticed in the hip.  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  Prior to the scan 18 months ago he did not notice it much but since injuring his back it has become more noticeable as he has some soreness in this area and its prominent when he sits. He now presents for the above procedure.    History was obtained from patient & chart review.     Hx of abnormal bleeding or anti-platelet use: denies      Past Medical History  Past Medical History:   Diagnosis Date     Diverticulitis of colon      Generalized anxiety disorder      Intramuscular lipoma      Morbid obesity (H)        Past Surgical History  Past Surgical History:   Procedure Laterality Date     ARTHROSCOPY KNEE WITH MEDIAL MENISCECTOMY  5/7/2014    Procedure: ARTHROSCOPY KNEE WITH MEDIAL MENISCECTOMY;  Surgeon: Artis Dougherty MD;  [facility]: [facility]     COLONOSCOPY N/A 3/30/2021    Procedure: Colonoscopy, With Polypectomy And Biopsy;  Surgeon: Daniel Doll MD;  Location: [facility] OR     COLONOSCOPY WITH CO2  INSUFFLATION N/A 3/30/2021    Procedure: COLONOSCOPY, WITH CO2 INSUFFLATION;  Surgeon: Daniel Doll MD;  Location: [facility] OR     NO HISTORY OF SURGERY         Prior to Admission Medications  Current Outpatient Medications   Medication Sig Dispense Refill     ALPRAZolam (XANAX) 0.25 MG tablet Take 1-2 tablets (0.25-0.5 mg) by mouth 3 times daily as needed for anxiety 10 tablet 1     Bioflavonoid Products (VITAMIN C) CHEW Take by mouth every morning       Digestive Enzymes CAPS Take by mouth as needed       meclizine (ANTIVERT) 25 MG tablet Take 1 tablet (25 mg) by mouth 3 times daily as needed for dizziness 30 tablet 3     Probiotic Product (PRO-BIOTIC BLEND PO) Take by mouth as needed         Allergies  No Known Allergies    Social History  Social History     Socioeconomic History     Marital status:      Spouse name: Not on file     Number of children: 2     Years of education: Not on file     Highest education level: Not on file   Occupational History     Employer: [employer]   Tobacco Use     Smoking status: Never     Smokeless tobacco: Never   Vaping Use     Vaping Use: Never used   Substance and Sexual Activity     Alcohol use: Not Currently     Comment: rare     Drug use: Never     Sexual activity: Yes     Partners: Female     Birth control/protection: Condom   Other Topics Concern     Parent/sibling w/ CABG, MI or angioplasty before 65F 55M? No   Social History Narrative     Not on file     Social Determinants of Health     Financial Resource Strain: Not on file   Food Insecurity: Not on file   Transportation Needs: Not on file   Physical Activity: Not on file   Stress: Not on file   Social Connections: Not on file   Intimate Partner Violence: Not on file   Housing Stability: Not on file       Family History  Family History   Problem Relation Age of Onset     Hypertension Mother      Thyroid Disease Mother         Thyroidectomy-CA     Diabetes Mother      C.A.D. Maternal  Grandmother         hx of bypass     Family History Negative No family hx of      Anesthesia Reaction No family hx of      Deep Vein Thrombosis (DVT) No family hx of        Review of Systems  The complete review of systems is negative other than noted in the HPI or here.     Anesthesia Evaluation   Pt has had prior anesthetic.     No history of anesthetic complications       ROS/MED HX  ENT/Pulmonary:  - neg pulmonary ROS  (-) tobacco use, asthma and sleep apnea   Neurologic:  - neg neurologic ROS   (+) no peripheral neuropathy  (-) no seizures and no CVA   Cardiovascular:  - neg cardiovascular ROS     METS/Exercise Tolerance: 4 - Raking leaves, gardening    Hematologic:  - neg hematologic  ROS  (-) history of blood clots and history of blood transfusion   Musculoskeletal: Comment: Intramuscular lipoma, left hip      GI/Hepatic:  - neg GI/hepatic ROS  (-) GERD and liver disease   Renal/Genitourinary:  - neg Renal ROS  (-) renal disease   Endo:     (+) Obesity,  (-) Type II DM   Psychiatric/Substance Use:  - neg psychiatric ROS     Infectious Disease:  - neg infectious disease ROS     Malignancy:  - neg malignancy ROS     Other:  - neg other ROS          Virtual visit -  No vitals were obtained    Physical Exam  Constitutional: Awake, alert, cooperative, no apparent distress, and appears stated age.  HENT: Normocephalic  Respiratory: non labored breathing   Neurologic: Awake, alert, oriented to name, place and time.   Neuropsychiatric: Calm, cooperative. Normal affect.      Prior Labs/Diagnostic Studies   All labs and imaging personally reviewed     LABS 8/26/22  SODIUM 136 - 145 mmol/L 137    POTASSIUM 3.5 - 5.1 mmol/L 4.2    Comment: The specimen is mildly hemolyzed and this result may be increased by >10%.  Interpret with caution and redraw if clinically indicated.   CHLORIDE 98 - 107 mmol/L 100    CARBON DIOXIDE 21 - 32 mmol/L 27    BUN (UREA NITRO) 7 - 18 mg/dL 11    CREATININE 0.70 - 1.30 mg/dL 1.18    EST GFR  (CKD-EPI) >60 mL/min >60    EST GFR IF AFRICAN AM >60 mL/min >60    GLUCOSE 70 - 110 mg/dL 112 High     CALCIUM, SERUM 8.5 - 10.1 mg/dL 8.4 Low     ANION GAP 0.0 - 15.0 mmol/L 10.0          WBC 4.3 - 10.8 K/uL 3.9 Low     RBC 4.60 - 6.20 M/uL 5.86    HEMOGLOBIN 14.0 - 18.0 gm/dL 17.3    HEMATOCRIT 40.0 - 54.0 % 50.7    MCV 80 - 100 fl 87    MCH 27 - 33 pg 30    MCHC 33 - 36 gm/dL 34    RDW 11.5 - 14.5 % 13.2    PLATELET COUNT     Comment: Unable to report platelets. RN to recollect.   MPV     Comment: Unable to report platelets. RN to recollect.       MR pelvis without contrast 6/21/2022  Impression:  Fat-containing mass in the left buttock, which may represent  intramuscular lipoma. Size greater than 10 cm raises concern for  atypical lipomatous tumor. Recommend follow-up with orthopedic  oncology at [facility].      CT ABD/PELVIS W CONTRAST 2/7/21  IMPRESSION:   1.  Acute diverticulitis of the mid sigmoid colon with associated localized perforation. Free air seen within the upper pelvis with moderately extensive pericolonic inflammatory change. No free intraperitoneal fluid or abscess.   2.  Large lipoma of the left gluteus jose angel measuring up to 12.7 x 10 cm in size.   3.  Diffuse fatty infiltration of the liver.     The patient's records and results personally reviewed by this provider.     Patient states that he will complete a home COVID test for above procedure.      Assessment      Kirt Burt is a 42 year old male seen as a PAC referral for risk assessment and optimization for anesthesia.    Plan/Recommendations  Pt will be optimized for the proposed procedure.  See below for details on the assessment, risk, and preoperative recommendations    NEUROLOGY  - No history of TIA, CVA or seizure    -Post Op delirium risk factors:  No risk identified    ENT  - No current airway concerns.  Will need to be reassessed day of surgery.  Mallampati: Unable to assess  TM: Unable to assess    CARDIAC  -  "No history of CAD, Hypertension and Afib  - METS (Metabolic Equivalents)  Patient performs 4 or more METS exercise without symptoms            Total Score: 0      RCRI-Very low risk: Class 1 0.4% complication rate            Total Score: 0        PULMONARY  MARCO Medium Risk            Total Score: 3    MARCO: BMI over 35 kg/m2    MARCO: Neck Circum >16 in    MARCO: Male      - Denies asthma or inhaler use  - Tobacco History  {Refresh the note if updates are made Link to Tobacco History :626912}    History   Smoking Status     Never   Smokeless Tobacco     Never       GI  - Denies GERD  PONV Medium Risk  Total Score: 2           1 AN PONV: Patient is not a current smoker    1 AN PONV: Intended Post Op Opioids            ENDOCRINE    - BMI: Estimated body mass index is 39.04 kg/m  as calculated from the following:    Height as of 9/7/22: 1.81 m (5' 11.26\").    Weight as of 9/7/22: 127.9 kg (282 lb).  Obesity (BMI >30)  - No history of Diabetes Mellitus. A1c on 8/7/21 was 5.4    HEME  VTE Low Risk 0.5%            Total Score: 2    VTE: Male      - No history of abnormal bleeding or antiplatelet use.      MSK  Patient is NOT Frail            Total Score: 0      - Intramuscular lipoma, left hip      The patient is optimized for their procedure. AVS with information on surgery time/arrival time, meds and NPO status given by nursing staff. No further diagnostic testing indicated.    Please refer to the physical examination documented by the anesthesiologist in the anesthesia record on the day of surgery.    Final plan per anesthesiologist on day of surgery.    Video-Visit Details    Type of service:  Video Visit    Provider received verbal consent for a Video Visit from the patient? Yes    Video Start Time: 1416  Video End Time (time video stopped): 1422    Originating Location (pt. Location): Home    Distant Location (provider location): Off-site    Mode of Communication:  Video Conference via Offerama  On the day of service: "     Prep time: 13 minutes  Visit time: 6 minutes  Documentation time: 10 minutes  ------------------------------------------  Total time: 29 minutes      Danielle Kenny PA-C  Preoperative Assessment Center  [facility]  Clinic and Surgery Center  Phone: [phone]  Fax: [phone]

## 2022-11-02 ASSESSMENT — ENCOUNTER SYMPTOMS: SEIZURES: 0

## 2022-11-02 ASSESSMENT — LIFESTYLE VARIABLES: TOBACCO_USE: 0

## 2022-11-02 NOTE — ANESTHESIA PREPROCEDURE EVALUATION
Anesthesia Pre-Procedure Evaluation    Patient: Kirt Burt   MRN: [id number] : 1980        Procedure : Procedure(s):  Excision left hip mass          Past Medical History:   Diagnosis Date     Diverticulitis of colon      Generalized anxiety disorder      Intramuscular lipoma      Morbid obesity (H)       Past Surgical History:   Procedure Laterality Date     ARTHROSCOPY KNEE WITH MEDIAL MENISCECTOMY  2014    Procedure: ARTHROSCOPY KNEE WITH MEDIAL MENISCECTOMY;  Surgeon: Artis Dougherty MD;  Location: [facility]     COLONOSCOPY N/A 3/30/2021    Procedure: Colonoscopy, With Polypectomy And Biopsy;  Surgeon: Daniel Doll MD;  Location: [facility]     COLONOSCOPY WITH CO2 INSUFFLATION N/A 3/30/2021    Procedure: COLONOSCOPY, WITH CO2 INSUFFLATION;  Surgeon: Daniel Doll MD;  Location: [facility]     NO HISTORY OF SURGERY        No Known Allergies   Social History     Tobacco Use     Smoking status: Never     Smokeless tobacco: Never   Substance Use Topics     Alcohol use: Not Currently     Comment: rare      Wt Readings from Last 1 Encounters:   22 127.9 kg (282 lb)        Anesthesia Evaluation   Pt has had prior anesthetic.     No history of anesthetic complications       ROS/MED HX  ENT/Pulmonary:  - neg pulmonary ROS  (-) tobacco use, asthma and sleep apnea   Neurologic:  - neg neurologic ROS   (+) no peripheral neuropathy  (-) no seizures and no CVA   Cardiovascular:  - neg cardiovascular ROS     METS/Exercise Tolerance: 4 - Raking leaves, gardening    Hematologic:  - neg hematologic  ROS  (-) history of blood clots and history of blood transfusion   Musculoskeletal: Comment: Intramuscular lipoma, left hip      GI/Hepatic:  - neg GI/hepatic ROS  (-) GERD and liver disease   Renal/Genitourinary:  - neg Renal ROS  (-) renal disease   Endo:     (+) Obesity,  (-) Type II DM   Psychiatric/Substance Use:  - neg psychiatric ROS     Infectious Disease:  - neg infectious disease ROS      Malignancy:  - neg malignancy ROS     Other:  - neg other ROS          Physical Exam    Airway  airway exam normal           Respiratory Devices and Support         Dental  no notable dental history         Cardiovascular   cardiovascular exam normal          Pulmonary   pulmonary exam normal                OUTSIDE LABS:  CBC:   Lab Results   Component Value Date    WBC 6.2 02/25/2014    WBC 7.9 11/09/2012    HGB 15.4 02/25/2014    HGB 16.2 11/09/2012    HCT 44.5 02/25/2014    HCT 45.9 11/09/2012     02/25/2014     11/09/2012     BMP:   Lab Results   Component Value Date     04/02/2014     (H) 11/09/2012    POTASSIUM 4.1 04/02/2014    POTASSIUM 4.3 11/09/2012    CHLORIDE 107 04/02/2014    CHLORIDE 104 11/09/2012    CO2 27 04/02/2014    CO2 29 11/09/2012    BUN 13 04/02/2014    BUN 12 11/09/2012    CR 0.95 04/02/2014    CR 1.01 11/09/2012    GLC 93 08/17/2021     (H) 04/02/2014     COAGS: No results found for: PTT, INR, FIBR  POC: No results found for: BGM, HCG, HCGS  HEPATIC:   Lab Results   Component Value Date    ALBUMIN 4.2 04/02/2014    PROTTOTAL 7.6 04/02/2014    ALT 53 04/02/2014    AST 29 04/02/2014    ALKPHOS 61 04/02/2014    BILITOTAL 0.7 04/02/2014     OTHER:   Lab Results   Component Value Date    A1C 5.4 08/17/2021    JAMILAH 9.5 04/02/2014    TSH 1.52 07/29/2019    T4 1.03 04/02/2014       Anesthesia Plan    ASA Status:  2   NPO Status:  NPO Appropriate    Anesthesia Type: General.     - Airway: LMA   Induction: Intravenous, Propofol.   Maintenance: Balanced.        Consents    Anesthesia Plan(s) and associated risks, benefits, and realistic alternatives discussed. Questions answered and patient/representative(s) expressed understanding.    - Discussed:     - Discussed with:  Patient      - Extended Intubation/Ventilatory Support Discussed: No.      - Patient is DNR/DNI Status: No    Use of blood products discussed: No .     Postoperative Care    Pain management: IV  analgesics, Oral pain medications, Multi-modal analgesia.   PONV prophylaxis: Dexamethasone or Solumedrol, Ondansetron (or other 5HT-3), Background Propofol Infusion     Comments:                Dayron Warner MD

## 2022-11-03 ENCOUNTER — HOSPITAL ENCOUNTER (OUTPATIENT)
Facility: AMBULATORY SURGERY CENTER | Age: 42
Discharge: HOME | End: 2022-11-03
Attending: ORTHOPAEDIC SURGERY
Payer: COMMERCIAL

## 2022-11-03 ENCOUNTER — ANESTHESIA (OUTPATIENT)
Dept: SURGERY | Facility: AMBULATORY SURGERY CENTER | Age: 42
End: 2022-11-03
Payer: COMMERCIAL

## 2022-11-03 VITALS
RESPIRATION RATE: 18 BRPM | TEMPERATURE: 97.5 F | HEART RATE: 78 BPM | BODY MASS INDEX: 39.62 KG/M2 | OXYGEN SATURATION: 97 % | DIASTOLIC BLOOD PRESSURE: 74 MMHG | WEIGHT: 283 LBS | HEIGHT: 71 IN | SYSTOLIC BLOOD PRESSURE: 119 MMHG

## 2022-11-03 DIAGNOSIS — D17.9 INTRAMUSCULAR LIPOMA: ICD-10-CM

## 2022-11-03 PROCEDURE — 88304 TISSUE EXAM BY PATHOLOGIST: CPT | Mod: 26 | Performed by: PATHOLOGY

## 2022-11-03 PROCEDURE — 88342 IMHCHEM/IMCYTCHM 1ST ANTB: CPT | Mod: 26 | Performed by: PATHOLOGY

## 2022-11-03 PROCEDURE — 27045 EXC HIP/PELV TUM DEEP 5 CM/>: CPT | Mod: LT

## 2022-11-03 PROCEDURE — 999N000127 HC STATISTIC PERIPHERAL IV START W US GUIDANCE

## 2022-11-03 PROCEDURE — 88341 IMHCHEM/IMCYTCHM EA ADD ANTB: CPT | Mod: 26 | Performed by: PATHOLOGY

## 2022-11-03 PROCEDURE — 88342 IMHCHEM/IMCYTCHM 1ST ANTB: CPT | Mod: TC | Performed by: ORTHOPAEDIC SURGERY

## 2022-11-03 PROCEDURE — 27045 EXC HIP/PELV TUM DEEP 5 CM/>: CPT | Mod: LT | Performed by: ORTHOPAEDIC SURGERY

## 2022-11-03 RX ORDER — HYDROXYZINE HYDROCHLORIDE 25 MG/1
25 TABLET, FILM COATED ORAL
Status: DISCONTINUED | OUTPATIENT
Start: 2022-11-03 | End: 2022-11-04 | Stop reason: HOSPADM

## 2022-11-03 RX ORDER — OXYCODONE HYDROCHLORIDE 5 MG/1
5-10 TABLET ORAL EVERY 4 HOURS PRN
Qty: 20 TABLET | Refills: 0 | Status: SHIPPED | OUTPATIENT
Start: 2022-11-03 | End: 2022-11-07

## 2022-11-03 RX ORDER — BUPIVACAINE HYDROCHLORIDE 2.5 MG/ML
INJECTION, SOLUTION INFILTRATION; PERINEURAL PRN
Status: DISCONTINUED | OUTPATIENT
Start: 2022-11-03 | End: 2022-11-03 | Stop reason: HOSPADM

## 2022-11-03 RX ORDER — SODIUM CHLORIDE, SODIUM LACTATE, POTASSIUM CHLORIDE, CALCIUM CHLORIDE 600; 310; 30; 20 MG/100ML; MG/100ML; MG/100ML; MG/100ML
INJECTION, SOLUTION INTRAVENOUS CONTINUOUS
Status: DISCONTINUED | OUTPATIENT
Start: 2022-11-03 | End: 2022-11-03 | Stop reason: HOSPADM

## 2022-11-03 RX ORDER — SODIUM CHLORIDE, SODIUM LACTATE, POTASSIUM CHLORIDE, CALCIUM CHLORIDE 600; 310; 30; 20 MG/100ML; MG/100ML; MG/100ML; MG/100ML
INJECTION, SOLUTION INTRAVENOUS CONTINUOUS
Status: DISCONTINUED | OUTPATIENT
Start: 2022-11-03 | End: 2022-11-04 | Stop reason: HOSPADM

## 2022-11-03 RX ORDER — ONDANSETRON 2 MG/ML
4 INJECTION INTRAMUSCULAR; INTRAVENOUS EVERY 30 MIN PRN
Status: DISCONTINUED | OUTPATIENT
Start: 2022-11-03 | End: 2022-11-04 | Stop reason: HOSPADM

## 2022-11-03 RX ORDER — IBUPROFEN 200 MG
600 TABLET ORAL
Status: DISCONTINUED | OUTPATIENT
Start: 2022-11-03 | End: 2022-11-04 | Stop reason: HOSPADM

## 2022-11-03 RX ORDER — LIDOCAINE 40 MG/G
CREAM TOPICAL
Status: DISCONTINUED | OUTPATIENT
Start: 2022-11-03 | End: 2022-11-03 | Stop reason: HOSPADM

## 2022-11-03 RX ORDER — CEFAZOLIN SODIUM IN 0.9 % NACL 3 G/100 ML
3 INTRAVENOUS SOLUTION, PIGGYBACK (ML) INTRAVENOUS
Status: DISCONTINUED | OUTPATIENT
Start: 2022-11-03 | End: 2022-11-03 | Stop reason: HOSPADM

## 2022-11-03 RX ORDER — ONDANSETRON 4 MG/1
4 TABLET, ORALLY DISINTEGRATING ORAL
Status: DISCONTINUED | OUTPATIENT
Start: 2022-11-03 | End: 2022-11-04 | Stop reason: HOSPADM

## 2022-11-03 RX ORDER — METHOCARBAMOL 750 MG/1
750 TABLET, FILM COATED ORAL
Status: DISCONTINUED | OUTPATIENT
Start: 2022-11-03 | End: 2022-11-04 | Stop reason: HOSPADM

## 2022-11-03 RX ORDER — DEXAMETHASONE SODIUM PHOSPHATE 4 MG/ML
INJECTION, SOLUTION INTRA-ARTICULAR; INTRALESIONAL; INTRAMUSCULAR; INTRAVENOUS; SOFT TISSUE PRN
Status: DISCONTINUED | OUTPATIENT
Start: 2022-11-03 | End: 2022-11-03

## 2022-11-03 RX ORDER — FENTANYL CITRATE 50 UG/ML
25 INJECTION, SOLUTION INTRAMUSCULAR; INTRAVENOUS EVERY 5 MIN PRN
Status: DISCONTINUED | OUTPATIENT
Start: 2022-11-03 | End: 2022-11-03 | Stop reason: HOSPADM

## 2022-11-03 RX ORDER — PROPOFOL 10 MG/ML
INJECTION, EMULSION INTRAVENOUS CONTINUOUS PRN
Status: DISCONTINUED | OUTPATIENT
Start: 2022-11-03 | End: 2022-11-03

## 2022-11-03 RX ORDER — GABAPENTIN 300 MG/1
300 CAPSULE ORAL
Status: COMPLETED | OUTPATIENT
Start: 2022-11-03 | End: 2022-11-03

## 2022-11-03 RX ORDER — ONDANSETRON 4 MG/1
4 TABLET, ORALLY DISINTEGRATING ORAL EVERY 30 MIN PRN
Status: DISCONTINUED | OUTPATIENT
Start: 2022-11-03 | End: 2022-11-04 | Stop reason: HOSPADM

## 2022-11-03 RX ORDER — AMOXICILLIN 250 MG
1-2 CAPSULE ORAL 2 TIMES DAILY
Qty: 30 TABLET | Refills: 0 | Status: SHIPPED | OUTPATIENT
Start: 2022-11-03 | End: 2024-05-13

## 2022-11-03 RX ORDER — LIDOCAINE HYDROCHLORIDE 20 MG/ML
INJECTION, SOLUTION INFILTRATION; PERINEURAL PRN
Status: DISCONTINUED | OUTPATIENT
Start: 2022-11-03 | End: 2022-11-03

## 2022-11-03 RX ORDER — PROPOFOL 10 MG/ML
INJECTION, EMULSION INTRAVENOUS PRN
Status: DISCONTINUED | OUTPATIENT
Start: 2022-11-03 | End: 2022-11-03

## 2022-11-03 RX ORDER — FENTANYL CITRATE 50 UG/ML
25 INJECTION, SOLUTION INTRAMUSCULAR; INTRAVENOUS
Status: DISCONTINUED | OUTPATIENT
Start: 2022-11-03 | End: 2022-11-04 | Stop reason: HOSPADM

## 2022-11-03 RX ORDER — ONDANSETRON 4 MG/1
4 TABLET, ORALLY DISINTEGRATING ORAL EVERY 8 HOURS PRN
Qty: 4 TABLET | Refills: 0 | Status: SHIPPED | OUTPATIENT
Start: 2022-11-03 | End: 2023-08-31

## 2022-11-03 RX ORDER — ONDANSETRON 2 MG/ML
INJECTION INTRAMUSCULAR; INTRAVENOUS PRN
Status: DISCONTINUED | OUTPATIENT
Start: 2022-11-03 | End: 2022-11-03

## 2022-11-03 RX ORDER — FENTANYL CITRATE 50 UG/ML
INJECTION, SOLUTION INTRAMUSCULAR; INTRAVENOUS PRN
Status: DISCONTINUED | OUTPATIENT
Start: 2022-11-03 | End: 2022-11-03

## 2022-11-03 RX ORDER — MEPERIDINE HYDROCHLORIDE 25 MG/ML
12.5 INJECTION INTRAMUSCULAR; INTRAVENOUS; SUBCUTANEOUS
Status: DISCONTINUED | OUTPATIENT
Start: 2022-11-03 | End: 2022-11-04 | Stop reason: HOSPADM

## 2022-11-03 RX ORDER — OXYCODONE HYDROCHLORIDE 5 MG/1
5 TABLET ORAL
Status: COMPLETED | OUTPATIENT
Start: 2022-11-03 | End: 2022-11-03

## 2022-11-03 RX ORDER — ACETAMINOPHEN 325 MG/1
975 TABLET ORAL ONCE
Status: COMPLETED | OUTPATIENT
Start: 2022-11-03 | End: 2022-11-03

## 2022-11-03 RX ORDER — CEFAZOLIN SODIUM IN 0.9 % NACL 3 G/100 ML
3 INTRAVENOUS SOLUTION, PIGGYBACK (ML) INTRAVENOUS SEE ADMIN INSTRUCTIONS
Status: DISCONTINUED | OUTPATIENT
Start: 2022-11-03 | End: 2022-11-03 | Stop reason: HOSPADM

## 2022-11-03 RX ADMIN — PROPOFOL 150 MCG/KG/MIN: 10 INJECTION, EMULSION INTRAVENOUS at 13:48

## 2022-11-03 RX ADMIN — FENTANYL CITRATE 50 MCG: 50 INJECTION, SOLUTION INTRAMUSCULAR; INTRAVENOUS at 13:48

## 2022-11-03 RX ADMIN — FENTANYL CITRATE 50 MCG: 50 INJECTION, SOLUTION INTRAMUSCULAR; INTRAVENOUS at 13:55

## 2022-11-03 RX ADMIN — SODIUM CHLORIDE, SODIUM LACTATE, POTASSIUM CHLORIDE, CALCIUM CHLORIDE: 600; 310; 30; 20 INJECTION, SOLUTION INTRAVENOUS at 13:35

## 2022-11-03 RX ADMIN — ONDANSETRON 4 MG: 2 INJECTION INTRAMUSCULAR; INTRAVENOUS at 13:42

## 2022-11-03 RX ADMIN — LIDOCAINE HYDROCHLORIDE 100 MG: 20 INJECTION, SOLUTION INFILTRATION; PERINEURAL at 13:48

## 2022-11-03 RX ADMIN — DEXAMETHASONE SODIUM PHOSPHATE 4 MG: 4 INJECTION, SOLUTION INTRA-ARTICULAR; INTRALESIONAL; INTRAMUSCULAR; INTRAVENOUS; SOFT TISSUE at 13:48

## 2022-11-03 RX ADMIN — GABAPENTIN 300 MG: 300 CAPSULE ORAL at 13:21

## 2022-11-03 RX ADMIN — ACETAMINOPHEN 975 MG: 325 TABLET ORAL at 13:21

## 2022-11-03 RX ADMIN — OXYCODONE HYDROCHLORIDE 5 MG: 5 TABLET ORAL at 15:04

## 2022-11-03 RX ADMIN — PROPOFOL 300 MG: 10 INJECTION, EMULSION INTRAVENOUS at 13:48

## 2022-11-03 NOTE — ANESTHESIA CARE TRANSFER NOTE
Patient: Kirt Burt    Procedure: Procedure(s):  Excision left hip mass       Diagnosis: Intramuscular lipoma [D17.9]  Diagnosis Additional Information: No value filed.    Anesthesia Type:   General     Note:    Oropharynx: oropharynx clear of all foreign objects and spontaneously breathing  Level of Consciousness: drowsy  Oxygen Supplementation: face mask  Level of Supplemental Oxygen (L/min / FiO2): 5    Dentition: dentition unchanged  Vital Signs Stable: post-procedure vital signs reviewed and stable  Report to RN Given: handoff report given  Patient transferred to: PACU  Comments: Uneventful transport with O2 face mask 6 lpm  Report to RN  Pt responds appropriately to command; sleepy  Exchanging well; color natl  Pt responds appropriately to command  IV patent  Lips/teeth/dentition as preop status  Questions answered    Handoff Report: Identifed the Patient, Identified the Reponsible Provider, Reviewed the pertinent medical history, Discussed the surgical course, Reviewed Intra-OP anesthesia mangement and issues during anesthesia, Set expectations for post-procedure period and Allowed opportunity for questions and acknowledgement of understanding      Vitals:  Vitals Value Taken Time   /74 11/03/22 1452   Temp 36.3  C (97.3  F) 11/03/22 1450   Pulse 66 11/03/22 1456   Resp 13 11/03/22 1456   SpO2 98 % 11/03/22 1456   Vitals shown include unvalidated device data.    Electronically Signed By: YURIY RAYMOND CRNA  November 3, 2022  2:57 PM

## 2022-11-03 NOTE — BRIEF OP NOTE
[facility]    Brief Operative Note    Pre-operative diagnosis: Intramuscular lipoma [D17.9]  Post-operative diagnosis Same as pre-operative diagnosis    Procedure: Procedure(s):  Excision left hip mass  Surgeon: Surgeon(s) and Role:     * Surjit Cavanaugh MD - Primary     * Mingo Diaz MD - Resident - Assisting  Anesthesia: General   Estimated Blood Loss: 20 mL from 11/3/2022  1:39 PM to 11/3/2022  2:50 PM      Drains: None  Specimens:   ID Type Source Tests Collected by Time Destination   1 : Left hip mass Tissue Hip, Left SURGICAL PATHOLOGY EXAM Surjit Cavanaugh MD 11/3/2022  2:18 PM      Findings:   see full dictated operative note.  Complications: None.  Implants: * No implants in log *      Orthopaedic Plan:  ADAT  Keep dressing c/d/i x 7 days  Okay to remove after 7 days. Allow steri-strips to stay in place until they fall off on their own   Okay to shower and allow water to run over the waterproof dressing  WBAT  Pain medication as prescribed  Okay to discharge when meets PACU discharge criteria   Follow up in 2 weeks with Dr. Cavanaugh Team for wound check and review of pathology      Mingo Diaz MD  Orthopedic Surgery PGY4  [phone]    Please page me directly with any questions/concerns during regular weekday hours before 5 pm. If there is no response, if it is a weekend, or if it is during evening hours then please page the orthopedic surgery resident on call.

## 2022-11-03 NOTE — ANESTHESIA POSTPROCEDURE EVALUATION
Patient: Kirt Burt    Procedure: Procedure(s):  Excision left hip mass       Anesthesia Type:  General    Note:  Disposition: Outpatient   Postop Pain Control: Uneventful            Sign Out: Well controlled pain   PONV:    Neuro/Psych: Uneventful            Sign Out: Acceptable/Baseline neuro status   Airway/Respiratory: Uneventful            Sign Out: Acceptable/Baseline resp. status   CV/Hemodynamics: Uneventful            Sign Out: Acceptable CV status; No obvious hypovolemia; No obvious fluid overload   Other NRE:    DID A NON-ROUTINE EVENT OCCUR?            Last vitals:  Vitals Value Taken Time   /72 11/03/22 1510   Temp 36.3  C (97.3  F) 11/03/22 1510   Pulse 73 11/03/22 1509   Resp 15 11/03/22 1509   SpO2 95 % 11/03/22 1510   Vitals shown include unvalidated device data.    Electronically Signed By: Dayron Warnre MD  November 3, 2022  3:15 PM

## 2022-11-03 NOTE — INTERVAL H&P NOTE
"I have reviewed the surgical (or preoperative) H&P that is linked to this encounter, and examined the patient. There are no significant changes    Clinical Conditions Present on Arrival:  Clinically Significant Risk Factors Present on Admission                    # Obesity: Estimated body mass index is 39.47 kg/m  as calculated from the following:    Height as of this encounter: 1.803 m (5' 11\").    Weight as of this encounter: 128.4 kg (283 lb).       "

## 2022-11-03 NOTE — DISCHARGE INSTRUCTIONS
"[facility]  Home Care Following Anesthesia  For 24 hours after surgery:  Get plenty of rest.  A responsible adult must stay with you for at least 24 hours after you leave the surgery center.  Do not drive or use heavy equipment.  If you have weakness or tingling, don't drive or use heavy equipment until this feeling goes away.   Do not drink alcohol.   Avoid strenuous or risky activities.  Ask for help when climbing stairs.  You may feel lightheaded.  IF so, sit for a few minutes before standing.  Have someone help you get up.   If you have nausea (feel sick to your stomach): Drink only clear liquids such as apple juice, ginger ale, broth or 7-Up.  Rest may also help.  Be sure to drink enough fluids.  Move to a regular diet as you feel able.   You may have a slight fever.  Call the doctor if your fever is over 100 F (37.7 C) (taken under the tongue) or lasts longer than 24 hours.  You may have a dry mouth, a sore throat, muscle aches or trouble sleeping. These should go away after 24 hours.  Do not make important or legal decisions.   It is recommended to avoid smoking.        Today you received a Marcaine or bupivacaine block to numb the nerves near your surgery site.  This is a block using local anesthetic or \"numbing\" medication injected around the nerves to anesthetize or \"numb\" the area supplied by those nerves.  This block is injected into the muscle layer near your surgical site.  The medication may numb the location where you had surgery for 6-18 hours, but may last up to 24 hours.  If your surgical site is an arm or leg you should be careful with your affected limb, since it is possible to injure your limb without being aware of it due to the numbing.  Until full feeling returns, you should guard against bumping or hitting your limb, and avoid extreme hot or cold temperatures on the skin.  As the block wears off, the feeling will return as a tingling or prickly " sensation near your surgical site.  You will experience more discomfort from your incision as the feeling returns.  You may want to take a pain pill (a narcotic or Tylenol if this was prescribed by your surgeon) when you start to experience mild pain before the pain beccomes more severe.  If your pain medications do not control your pain you should notifiy your surgeon.    Tips for taking pain medications  To get the best pain relief possible, remember these points:  Take pain medications as directed, before pain becomes severe.  Pain medication can upset your stomach: taking it with food may help.  Constipation is a common side effect of pain medication. Drink plenty of  fluids.  Eat foods high in fiber. Take a stool softener if recommended by your doctor or pharmacist.  Do not drink alcohol, drive or operate machinery while taking pain medications.  Ask about other ways to control pain, such as with heat, ice or relaxation.    Tylenol/Acetaminophen Consumption  To help encourage the safe use of acetaminophen, the makers of TYLENOL  have lowered the maximum daily dose for single-ingredient Extra Strength TYLENOL  (acetaminophen) products sold in the [place] from 8 pills per day (4,000 mg) to 6 pills per day (3,000 mg). The dosing interval has also changed from 2 pills every 4-6 hours to 2 pills every 6 hours.  If you feel your pain relief is insufficient, you may take Tylenol/Acetaminophen in addition to your narcotic pain medication.   Be careful not to exceed 3,000 mg of Tylenol/Acetaminophen in a 24 hour period from all sources.  If you are taking extra strength Tylenol/acetaminophen (500 mg), the maximum dose is 6 tablets in 24 hours.  If you are taking regular strength acetaminophen (325 mg), the maximum dose is 9 tablets in 24 hours.    Call a doctor for any of the following:  Signs of infection (fever, growing tenderness at the surgery site, a large amount of drainage or bleeding, severe pain, foul-smelling  drainage, redness, swelling).  It has been over 8 to 10 hours since surgery and you are still not able to urinate (pass water).  Headache for over 24 hours.  Numbness, tingling or weakness the day after surgery (if you had spinal anesthesia).  Signs of Covid-19 infection (temperature over 100 degrees, shortness of breath, cough, loss of taste/smell, generalized body aches, persistent headache, chills, sore throat, nausea/vomiting/diarrhea)  Your doctor is:       Dr. Surjit Cavanaugh, Orthopaedics: [phone]               Or dial [phone] and ask for the resident on call for:  Orthopaedics  For emergency care, call the:  [facility] Emergency Department: [phone] (TTY for hearing impaired: [phone])

## 2022-11-03 NOTE — OP NOTE
DATE OF SURGERY: 11/3/2022    PREOPERATIVE DIAGNOSIS: Left gluteus intramuscular lipoma    POSTOPERATIVE DIAGNOSIS: Left gluteus intramuscular lipoma    PROCEDURE: Excision left hip mass, deep, greater than 5 cm    SURGEON: Surjit Cavanaugh MD     ASSISTANT: Mingo Diaz MD    PATIENT HISTORY: This patient is noticed a growing mass in his left hip and presents now to have this excised understand the risks of bleeding infection pain limp numbness tingling weakness.    DESCRIPTION OF PROCEDURE: The patient underwent successful induction of anesthesia.  He was positioned laterally and then the arms and legs were well padded.  The left hip and pelvis were washed and sterilely prepped and draped.  We made an incision along the lines of the fibers of the gluteus jose angel muscle sharply through skin divided subcutaneous tissue with cautery and then opened up the muscle fascia and split the muscle.  The lipoma within the muscle was identified and then we dissected around it and divided blood vessels going to the tumor.  We divided some adherent muscle fibers as well and remove the tumor in 1 piece.  It was sent to pathology in formalin.  We cauterize some small bleeding vessels and then copiously irrigated.  We closed the fascia with 0 Vicryl the subcutaneous tissue with 2-0 Vicryl and the skin with 4 Monocryl followed by Steri-Strips and a sterile dry dressing.  The patient was then turned supine extubated and taken to the recovery room in stable condition.  The estimated blood loss is 20 mL.  There were no complications.  I was present for all critical portions of the procedure.    Surjti Cavanaugh MD

## 2022-11-07 ENCOUNTER — MYC REFILL (OUTPATIENT)
Dept: SURGERY | Facility: AMBULATORY SURGERY CENTER | Age: 42
End: 2022-11-07

## 2022-11-07 DIAGNOSIS — D17.9 INTRAMUSCULAR LIPOMA: ICD-10-CM

## 2022-11-07 RX ORDER — OXYCODONE HYDROCHLORIDE 5 MG/1
5-10 TABLET ORAL EVERY 4 HOURS PRN
Qty: 20 TABLET | Refills: 0 | Status: SHIPPED | OUTPATIENT
Start: 2022-11-07 | End: 2023-08-31

## 2022-11-07 NOTE — TELEPHONE ENCOUNTER
Pt reached out to clinic for a refill of oxycodone. Pt states to be taking 1 tab q4h PRN. States pain is sharp and achy. Pt states to be taking tylenol and ibuprofen PRN at bedtime and for extra pain management. Pt states to be icing q2h in 5 minute increments, anything more is too painful. Pt is ambulating q4-6 hrs for pain relief. Refill request routed to Dr. Cavanaugh.     Alfred Bui RNCC

## 2022-11-09 LAB
PATH REPORT.COMMENTS IMP SPEC: NORMAL
PATH REPORT.FINAL DX SPEC: NORMAL
PATH REPORT.GROSS SPEC: NORMAL
PATH REPORT.MICROSCOPIC SPEC OTHER STN: NORMAL
PATH REPORT.RELEVANT HX SPEC: NORMAL
PHOTO IMAGE: NORMAL

## 2022-11-18 ENCOUNTER — OFFICE VISIT (OUTPATIENT)
Dept: ORTHOPEDICS | Facility: CLINIC | Age: 42
End: 2022-11-18
Payer: COMMERCIAL

## 2022-11-18 DIAGNOSIS — D17.9 INTRAMUSCULAR LIPOMA: Primary | ICD-10-CM

## 2022-11-18 PROCEDURE — 99024 POSTOP FOLLOW-UP VISIT: CPT | Performed by: PHYSICIAN ASSISTANT

## 2022-11-18 NOTE — PROGRESS NOTES
Chief Complaint:  Pre-operative diagnosis:  Intramuscular lipoma [D17.9]    11/3/22 Procedure: Procedure(s): Excision left hip mass    HPI: Kirt is a 42-year-old man here with his wife today for follow-up 2 weeks status post above procedure by Dr. Cavanaugh.  Patient reports that overall he is doing well.  He does still have some soreness in the left buttock, but it is improving.  He originally was taking Tylenol and oxycodone, but has tapered off.  He is back to work at his remote [employer] job.  He is able to sit without difficulties.  He feels he is sitting straighter now.  He no longer has the aching pain in his buttock.  No other concerns.    Physical Exam: Kitr is a pleasant 42-year-old man who is alert and oriented no apparent distress.  He has a nonantalgic reciprocal gait without gait assistance today.  His left buttock incision is well-healed.  There is no erythema or drainage.  It is nontender to palpation.  He has mild swelling and no ecchymosis.    Pathology:   Final Diagnosis   A. Left hip mass, excision:  - Intramuscular lipoma       Impression: 42-year-old male doing well status post excision of the left intramuscular lipoma to the left buttock    Plan: Kirt is doing very well.  I explained to him that we do not expect this to recur, it is very rare that this would come back.  He can progress back to full activities as tolerated.  It is normal to feel soreness up to 6 weeks after this type of surgery.  He can use vitamin E oil or cocoa butter on the incision.  He can call with any concerns and follow-up as needed.  All questions answered.

## 2022-11-18 NOTE — LETTER
11/18/2022         RE: Kirt Burt  [address] [place] [address]        Dear Colleague,    Thank you for referring your patient, Kirt Burt, to the [facility] [place]. Please see a copy of my visit note below.    Chief Complaint:  Pre-operative diagnosis:  Intramuscular lipoma [D17.9]    11/3/22 Procedure: Procedure(s): Excision left hip mass    HPI: Kirt is a 42-year-old man here with his wife today for follow-up 2 weeks status post above procedure by Dr. Cavanaugh.  Patient reports that overall he is doing well.  He does still have some soreness in the left buttock, but it is improving.  He originally was taking Tylenol and oxycodone, but has tapered off.  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  He is able to sit without difficulties.  He feels he is sitting straighter now.  He no longer has the aching pain in his buttock.  No other concerns.    Physical Exam: Kirt is a pleasant 42-year-old man who is alert and oriented no apparent distress.  He has a nonantalgic reciprocal gait without gait assistance today.  His left buttock incision is well-healed.  There is no erythema or drainage.  It is nontender to palpation.  He has mild swelling and no ecchymosis.    Pathology:   Final Diagnosis   A. Left hip mass, excision:  - Intramuscular lipoma       Impression: 42-year-old male doing well status post excision of the left intramuscular lipoma to the left buttock    Plan: Kirt is doing very well.  I explained to him that we do not expect this to recur, it is very rare that this would come back.  He can progress back to full activities as tolerated.  It is normal to feel soreness up to 6 weeks after this type of surgery.  He can use vitamin E oil or cocoa butter on the incision.  He can call with any concerns and follow-up as needed.  All questions answered.          Bobbi Salcedo PA-C

## 2022-11-18 NOTE — NURSING NOTE
Reason For Visit:   Chief Complaint   Patient presents with     RECHECK     Post op, DOS: 11/03/2022 Excision of left hip mass.        There were no vitals taken for this visit.    Pain Assessment  Patient Currently in Pain: Luma Lomas, EMT

## 2022-11-19 ENCOUNTER — HEALTH MAINTENANCE LETTER (OUTPATIENT)
Age: 42
End: 2022-11-19

## 2023-08-31 ENCOUNTER — OFFICE VISIT (OUTPATIENT)
Dept: PEDIATRICS | Facility: CLINIC | Age: 43
End: 2023-08-31
Payer: COMMERCIAL

## 2023-08-31 ENCOUNTER — TELEPHONE (OUTPATIENT)
Dept: FAMILY MEDICINE | Facility: OTHER | Age: 43
End: 2023-08-31

## 2023-08-31 VITALS
OXYGEN SATURATION: 96 % | TEMPERATURE: 97.8 F | RESPIRATION RATE: 11 BRPM | SYSTOLIC BLOOD PRESSURE: 123 MMHG | DIASTOLIC BLOOD PRESSURE: 88 MMHG | HEART RATE: 80 BPM

## 2023-08-31 DIAGNOSIS — R10.9 ACUTE RIGHT FLANK PAIN: ICD-10-CM

## 2023-08-31 DIAGNOSIS — N20.1 CALCULUS OF URETER: Primary | ICD-10-CM

## 2023-08-31 LAB
ALBUMIN SERPL BCG-MCNC: 4.5 G/DL (ref 3.5–5.2)
ALBUMIN UR-MCNC: NEGATIVE MG/DL
ALP SERPL-CCNC: 69 U/L (ref 40–129)
ALT SERPL W P-5'-P-CCNC: 70 U/L (ref 0–70)
ANION GAP SERPL CALCULATED.3IONS-SCNC: 10 MMOL/L (ref 7–15)
APPEARANCE UR: CLEAR
AST SERPL W P-5'-P-CCNC: 45 U/L (ref 0–45)
BASOPHILS # BLD AUTO: 0 10E3/UL (ref 0–0.2)
BASOPHILS NFR BLD AUTO: 0 %
BILIRUB SERPL-MCNC: 0.9 MG/DL
BILIRUB UR QL STRIP: NEGATIVE
BUN SERPL-MCNC: 13.9 MG/DL (ref 6–20)
CALCIUM SERPL-MCNC: 9.4 MG/DL (ref 8.6–10)
CHLORIDE SERPL-SCNC: 104 MMOL/L (ref 98–107)
COLOR UR AUTO: YELLOW
CREAT SERPL-MCNC: 1.74 MG/DL (ref 0.67–1.17)
CRP SERPL-MCNC: 61 MG/L
DEPRECATED HCO3 PLAS-SCNC: 24 MMOL/L (ref 22–29)
EOSINOPHIL # BLD AUTO: 0.1 10E3/UL (ref 0–0.7)
EOSINOPHIL NFR BLD AUTO: 1 %
ERYTHROCYTE [DISTWIDTH] IN BLOOD BY AUTOMATED COUNT: 13.2 % (ref 10–15)
GFR SERPL CREATININE-BSD FRML MDRD: 49 ML/MIN/1.73M2
GLUCOSE SERPL-MCNC: 117 MG/DL (ref 70–99)
GLUCOSE UR STRIP-MCNC: NEGATIVE MG/DL
HCT VFR BLD AUTO: 43.4 % (ref 40–53)
HGB BLD-MCNC: 14.8 G/DL (ref 13.3–17.7)
HGB UR QL STRIP: ABNORMAL
IMM GRANULOCYTES # BLD: 0 10E3/UL
IMM GRANULOCYTES NFR BLD: 0 %
KETONES UR STRIP-MCNC: NEGATIVE MG/DL
LEUKOCYTE ESTERASE UR QL STRIP: NEGATIVE
LYMPHOCYTES # BLD AUTO: 1.5 10E3/UL (ref 0.8–5.3)
LYMPHOCYTES NFR BLD AUTO: 18 %
MCH RBC QN AUTO: 29.7 PG (ref 26.5–33)
MCHC RBC AUTO-ENTMCNC: 34.1 G/DL (ref 31.5–36.5)
MCV RBC AUTO: 87 FL (ref 78–100)
MONOCYTES # BLD AUTO: 1 10E3/UL (ref 0–1.3)
MONOCYTES NFR BLD AUTO: 12 %
NEUTROPHILS # BLD AUTO: 5.9 10E3/UL (ref 1.6–8.3)
NEUTROPHILS NFR BLD AUTO: 69 %
NITRATE UR QL: NEGATIVE
NRBC # BLD AUTO: 0 10E3/UL
NRBC BLD AUTO-RTO: 0 /100
PH UR STRIP: 5.5 [PH] (ref 5–7)
PLATELET # BLD AUTO: 215 10E3/UL (ref 150–450)
POTASSIUM SERPL-SCNC: 4.1 MMOL/L (ref 3.4–5.3)
PROT SERPL-MCNC: 7.5 G/DL (ref 6.4–8.3)
RBC # BLD AUTO: 4.99 10E6/UL (ref 4.4–5.9)
RBC #/AREA URNS AUTO: NORMAL /HPF
SODIUM SERPL-SCNC: 138 MMOL/L (ref 136–145)
SP GR UR STRIP: 1.02 (ref 1–1.03)
UROBILINOGEN UR STRIP-MCNC: NORMAL MG/DL
WBC # BLD AUTO: 8.5 10E3/UL (ref 4–11)
WBC #/AREA URNS AUTO: NORMAL /HPF

## 2023-08-31 PROCEDURE — 86140 C-REACTIVE PROTEIN: CPT | Performed by: NURSE PRACTITIONER

## 2023-08-31 PROCEDURE — 80053 COMPREHEN METABOLIC PANEL: CPT | Performed by: NURSE PRACTITIONER

## 2023-08-31 PROCEDURE — 85025 COMPLETE CBC W/AUTO DIFF WBC: CPT | Performed by: NURSE PRACTITIONER

## 2023-08-31 PROCEDURE — 99214 OFFICE O/P EST MOD 30 MIN: CPT | Performed by: NURSE PRACTITIONER

## 2023-08-31 PROCEDURE — 81001 URINALYSIS AUTO W/SCOPE: CPT | Performed by: NURSE PRACTITIONER

## 2023-08-31 PROCEDURE — 36415 COLL VENOUS BLD VENIPUNCTURE: CPT | Performed by: NURSE PRACTITIONER

## 2023-08-31 RX ORDER — OXYCODONE HYDROCHLORIDE 5 MG/1
5 TABLET ORAL 3 TIMES DAILY PRN
Qty: 6 TABLET | Refills: 0 | Status: SHIPPED | OUTPATIENT
Start: 2023-08-31 | End: 2023-08-31

## 2023-08-31 RX ORDER — OXYCODONE HYDROCHLORIDE 5 MG/1
5 TABLET ORAL 3 TIMES DAILY PRN
Qty: 6 TABLET | Refills: 0 | Status: SHIPPED | OUTPATIENT
Start: 2023-08-31 | End: 2024-05-13

## 2023-08-31 RX ORDER — TAMSULOSIN HYDROCHLORIDE 0.4 MG/1
0.4 CAPSULE ORAL DAILY
Qty: 14 CAPSULE | Refills: 0 | Status: SHIPPED | OUTPATIENT
Start: 2023-08-31 | End: 2023-09-14

## 2023-08-31 ASSESSMENT — PAIN SCALES - GENERAL: PAINLEVEL: SEVERE PAIN (7)

## 2023-08-31 NOTE — PATIENT INSTRUCTIONS
Results for orders placed or performed in visit on 08/31/23   UA with Microscopic reflex to Culture     Status: Abnormal    Specimen: Urine, Midstream   Result Value Ref Range    Color Urine Yellow Colorless, Straw, Light Yellow, Yellow    Appearance Urine Clear Clear    Glucose Urine Negative Negative mg/dL    Bilirubin Urine Negative Negative    Ketones Urine Negative Negative mg/dL    Specific Gravity Urine 1.021 0.999 - 1.035    Blood Urine Trace (A) Negative    pH Urine 5.5 5.0 - 7.0    Protein Albumin Urine Negative Negative mg/dL    Nitrite Urine Negative Negative    Leukocyte Esterase Urine Negative Negative    Urobilinogen Urine Normal Normal, 2.0 mg/dL   Comprehensive metabolic panel     Status: Abnormal   Result Value Ref Range    Sodium 138 136 - 145 mmol/L    Potassium 4.1 3.4 - 5.3 mmol/L    Chloride 104 98 - 107 mmol/L    Carbon Dioxide (CO2) 24 22 - 29 mmol/L    Anion Gap 10 7 - 15 mmol/L    Urea Nitrogen 13.9 6.0 - 20.0 mg/dL    Creatinine 1.74 (H) 0.67 - 1.17 mg/dL    Calcium 9.4 8.6 - 10.0 mg/dL    Glucose 117 (H) 70 - 99 mg/dL    Alkaline Phosphatase 69 40 - 129 U/L    AST 45 0 - 45 U/L    ALT 70 0 - 70 U/L    Protein Total 7.5 6.4 - 8.3 g/dL    Albumin 4.5 3.5 - 5.2 g/dL    Bilirubin Total 0.9 <=1.2 mg/dL    GFR Estimate 49 (L) >60 mL/min/1.73m2   CRP inflammation     Status: Abnormal   Result Value Ref Range    CRP Inflammation 61.00 (H) <5.00 mg/L   CBC with platelets and differential     Status: None   Result Value Ref Range    WBC Count 8.5 4.0 - 11.0 10e3/uL    RBC Count 4.99 4.40 - 5.90 10e6/uL    Hemoglobin 14.8 13.3 - 17.7 g/dL    Hematocrit 43.4 40.0 - 53.0 %    MCV 87 78 - 100 fL    MCH 29.7 26.5 - 33.0 pg    MCHC 34.1 31.5 - 36.5 g/dL    RDW 13.2 10.0 - 15.0 %    Platelet Count 215 150 - 450 10e3/uL    % Neutrophils 69 %    % Lymphocytes 18 %    % Monocytes 12 %    % Eosinophils 1 %    % Basophils 0 %    % Immature Granulocytes 0 %    NRBCs per 100 WBC 0 <1 /100    Absolute  Neutrophils 5.9 1.6 - 8.3 10e3/uL    Absolute Lymphocytes 1.5 0.8 - 5.3 10e3/uL    Absolute Monocytes 1.0 0.0 - 1.3 10e3/uL    Absolute Eosinophils 0.1 0.0 - 0.7 10e3/uL    Absolute Basophils 0.0 0.0 - 0.2 10e3/uL    Absolute Immature Granulocytes 0.0 <=0.4 10e3/uL    Absolute NRBCs 0.0 10e3/uL   UA Microscopic with Reflex to Culture     Status: Normal   Result Value Ref Range    RBC Urine 0-2 0-2 /HPF /HPF    WBC Urine 0-5 0-5 /HPF /HPF    Narrative    Urine Culture not indicated   CBC with platelets differential     Status: None    Narrative    The following orders were created for panel order CBC with platelets differential.  Procedure                               Abnormality         Status                     ---------                               -----------         ------                     CBC with platelets and d...[[id number]]                      Final result                 Please view results for these tests on the individual orders.       Start flomax daily until stone passes.    Small Rx for oxycodone given.  Do not take any xanax while you are taking the oxycodone.    Do not work or drive when taking the oxycodone.    May use acetaminophen as needed for mild to moderate pain.    Continue to strain the urine.

## 2023-08-31 NOTE — TELEPHONE ENCOUNTER
Provider discussed with RN and feels [facility] may be a better option than clinic visit. Can determine where stone is located currently and treat as needed.     Called and spoke with patient, he is open and agrees to be seen at [facility].     Spoke with [facility] and provider in clinic, [facility] agreed to see patient at 11 am today.     Called and updated patient with appointment time at [facility], address and telephone number.    Trang SURESH, RN  [facility]

## 2023-08-31 NOTE — PROGRESS NOTES
Assessment & Plan     Calculus of ureter  - UA with Microscopic reflex to Culture; Future  - CBC with platelets differential; Future  - Comprehensive metabolic panel; Future  - CRP inflammation; Future  - tamsulosin (FLOMAX) 0.4 MG capsule; Take 1 capsule (0.4 mg) by mouth daily for 14 days  - UA with Microscopic reflex to Culture  - CBC with platelets differential  - Comprehensive metabolic panel  - CRP inflammation  - oxyCODONE (ROXICODONE) 5 MG tablet; Take 1 tablet (5 mg) by mouth 3 times daily as needed for severe pain  - UA Microscopic with Reflex to Culture    Acute right flank pain  - UA with Microscopic reflex to Culture; Future  - CBC with platelets differential; Future  - Comprehensive metabolic panel; Future  - CRP inflammation; Future  - tamsulosin (FLOMAX) 0.4 MG capsule; Take 1 capsule (0.4 mg) by mouth daily for 14 days  - UA with Microscopic reflex to Culture  - CBC with platelets differential  - Comprehensive metabolic panel  - CRP inflammation  - oxyCODONE (ROXICODONE) 5 MG tablet; Take 1 tablet (5 mg) by mouth 3 times daily as needed for severe pain  - UA Microscopic with Reflex to Culture    Patient Instructions     Results for orders placed or performed in visit on 08/31/23   UA with Microscopic reflex to Culture     Status: Abnormal    Specimen: Urine, Midstream   Result Value Ref Range    Color Urine Yellow Colorless, Straw, Light Yellow, Yellow    Appearance Urine Clear Clear    Glucose Urine Negative Negative mg/dL    Bilirubin Urine Negative Negative    Ketones Urine Negative Negative mg/dL    Specific Gravity Urine 1.021 0.999 - 1.035    Blood Urine Trace (A) Negative    pH Urine 5.5 5.0 - 7.0    Protein Albumin Urine Negative Negative mg/dL    Nitrite Urine Negative Negative    Leukocyte Esterase Urine Negative Negative    Urobilinogen Urine Normal Normal, 2.0 mg/dL   Comprehensive metabolic panel     Status: Abnormal   Result Value Ref Range    Sodium 138 136 - 145 mmol/L    Potassium  4.1 3.4 - 5.3 mmol/L    Chloride 104 98 - 107 mmol/L    Carbon Dioxide (CO2) 24 22 - 29 mmol/L    Anion Gap 10 7 - 15 mmol/L    Urea Nitrogen 13.9 6.0 - 20.0 mg/dL    Creatinine 1.74 (H) 0.67 - 1.17 mg/dL    Calcium 9.4 8.6 - 10.0 mg/dL    Glucose 117 (H) 70 - 99 mg/dL    Alkaline Phosphatase 69 40 - 129 U/L    AST 45 0 - 45 U/L    ALT 70 0 - 70 U/L    Protein Total 7.5 6.4 - 8.3 g/dL    Albumin 4.5 3.5 - 5.2 g/dL    Bilirubin Total 0.9 <=1.2 mg/dL    GFR Estimate 49 (L) >60 mL/min/1.73m2   CRP inflammation     Status: Abnormal   Result Value Ref Range    CRP Inflammation 61.00 (H) <5.00 mg/L   CBC with platelets and differential     Status: None   Result Value Ref Range    WBC Count 8.5 4.0 - 11.0 10e3/uL    RBC Count 4.99 4.40 - 5.90 10e6/uL    Hemoglobin 14.8 13.3 - 17.7 g/dL    Hematocrit 43.4 40.0 - 53.0 %    MCV 87 78 - 100 fL    MCH 29.7 26.5 - 33.0 pg    MCHC 34.1 31.5 - 36.5 g/dL    RDW 13.2 10.0 - 15.0 %    Platelet Count 215 150 - 450 10e3/uL    % Neutrophils 69 %    % Lymphocytes 18 %    % Monocytes 12 %    % Eosinophils 1 %    % Basophils 0 %    % Immature Granulocytes 0 %    NRBCs per 100 WBC 0 <1 /100    Absolute Neutrophils 5.9 1.6 - 8.3 10e3/uL    Absolute Lymphocytes 1.5 0.8 - 5.3 10e3/uL    Absolute Monocytes 1.0 0.0 - 1.3 10e3/uL    Absolute Eosinophils 0.1 0.0 - 0.7 10e3/uL    Absolute Basophils 0.0 0.0 - 0.2 10e3/uL    Absolute Immature Granulocytes 0.0 <=0.4 10e3/uL    Absolute NRBCs 0.0 10e3/uL   UA Microscopic with Reflex to Culture     Status: Normal   Result Value Ref Range    RBC Urine 0-2 0-2 /HPF /HPF    WBC Urine 0-5 0-5 /HPF /HPF    Narrative    Urine Culture not indicated   CBC with platelets differential     Status: None    Narrative    The following orders were created for panel order CBC with platelets differential.  Procedure                               Abnormality         Status                     ---------                               -----------         ------               "       CBC with platelets and d...[[id number]]                      Final result                 Please view results for these tests on the individual orders.       Start flomax daily until stone passes.    Small Rx for oxycodone given.  Do not take any xanax while you are taking the oxycodone.    Do not work or drive when taking the oxycodone.    May use acetaminophen as needed for mild to moderate pain.    Continue to strain the urine.         BMI:   Estimated body mass index is 39.47 kg/m  as calculated from the following:    Height as of 11/3/22: 1.803 m (5' 11\").    Weight as of 11/3/22: 128.4 kg (283 lb).     Return in about 1 week (around 9/7/2023) for with regular provider if symptoms persist.    YURIY Ahuja CNP  [facility] WINSTON Moralez is a 43 year old, presenting for the following health issues:  Kidney Stone Related and Flank Pain      HPI     Abdominal/Flank Pain  Onset/Duration: 08/28/2023   Description:   Character: Dull ache and Stabbing  Location: right flank  Radiation: None  Intensity: moderate, 7/10  Progression of Symptoms:  same and intermittent  Accompanying Signs & Symptoms:  Fever/chills: YES  Gas/Bloating: YES  Nausea: YES  Vomitting: YES  Diarrhea: no   Constipation:no   Dysuria: no            Hematuria: YES- microscopy           Frequency: no            Incontinence of urine: no   History:            Last bowel movement: two days ago  Trauma: no   Previous similar pain: no    Previous tests done: CT           Previous Abdominal surgery: no   Precipitating factors:   Does the pain change with:     Food: YES     Bowel Movement: no     Urination: no              Other factors: no   Therapies tried and outcome:  None    When food last eaten: 08/30/2023 12 pm    Went to [facility] 2 days ago, 2 mm stone identified in ureter.    Still having pain, has been straining and no stone.    No fevers or chills.  Trying to drink extra fluids.    "     Review of Systems   Constitutional, HEENT, cardiovascular, pulmonary, GI, , musculoskeletal, neuro, skin, endocrine and psych systems are negative, except as otherwise noted.      Objective    /88 (BP Location: Right arm, Patient Position: Sitting, Cuff Size: Adult Large)   Pulse 80   Temp 97.8  F (36.6  C) (Oral)   Resp 11   SpO2 96%   There is no height or weight on file to calculate BMI.  Physical Exam   GENERAL: healthy, alert and no distress  RESP: lungs clear to auscultation - no rales, rhonchi or wheezes  CV: regular rate and rhythm, normal S1 S2, no S3 or S4, no murmur, click or rub, no peripheral edema and peripheral pulses strong  ABDOMEN: soft, nontender, no hepatosplenomegaly, no masses and bowel sounds normal  MS: no gross musculoskeletal defects noted, no edema  BACK: no CVA tenderness, no paralumbar tenderness

## 2023-08-31 NOTE — TELEPHONE ENCOUNTER
Patient was seen in the emergency department on Monday 8/28/23 at [facility].  Found to have right urethral stone.  Patient says per his discharge paperwork he is due for a follow up with primary care.     Reports he is still having a lot of pain. No visible blood in his urine currently, but it was detected on UA in ED.    Unable to access records from care everywhere. Instructed patient to bring discharge paper work to appointment today.     Appointments in Next Year      Aug 31, 2023 11:30 AM  (Arrive by 11:15 AM)  ED/Hospital Follow Up with Bran Corea PA-C  [facility] ([facility] ) [phone]          Trang Paredes BSN, RN  [facility]

## 2024-01-28 ENCOUNTER — HEALTH MAINTENANCE LETTER (OUTPATIENT)
Age: 44
End: 2024-01-28

## 2024-05-13 ENCOUNTER — OFFICE VISIT (OUTPATIENT)
Dept: FAMILY MEDICINE | Facility: OTHER | Age: 44
End: 2024-05-13
Payer: COMMERCIAL

## 2024-05-13 VITALS
OXYGEN SATURATION: 97 % | RESPIRATION RATE: 18 BRPM | HEART RATE: 82 BPM | TEMPERATURE: 97.9 F | WEIGHT: 293 LBS | DIASTOLIC BLOOD PRESSURE: 86 MMHG | SYSTOLIC BLOOD PRESSURE: 124 MMHG | BODY MASS INDEX: 41.95 KG/M2 | HEIGHT: 70 IN

## 2024-05-13 DIAGNOSIS — F41.1 GAD (GENERALIZED ANXIETY DISORDER): ICD-10-CM

## 2024-05-13 DIAGNOSIS — Z00.00 ROUTINE HISTORY AND PHYSICAL EXAMINATION OF ADULT: Primary | ICD-10-CM

## 2024-05-13 DIAGNOSIS — G47.33 OSA (OBSTRUCTIVE SLEEP APNEA): ICD-10-CM

## 2024-05-13 DIAGNOSIS — K57.20 DIVERTICULITIS OF COLON WITH PERFORATION: ICD-10-CM

## 2024-05-13 DIAGNOSIS — E78.5 HYPERLIPIDEMIA LDL GOAL <130: ICD-10-CM

## 2024-05-13 DIAGNOSIS — E66.01 MORBID OBESITY (H): ICD-10-CM

## 2024-05-13 DIAGNOSIS — E29.1 HYPOGONADISM MALE: ICD-10-CM

## 2024-05-13 LAB
ALBUMIN SERPL BCG-MCNC: 4.3 G/DL (ref 3.5–5.2)
ALP SERPL-CCNC: 70 U/L (ref 40–150)
ALT SERPL W P-5'-P-CCNC: 45 U/L (ref 0–70)
ANION GAP SERPL CALCULATED.3IONS-SCNC: 9 MMOL/L (ref 7–15)
AST SERPL W P-5'-P-CCNC: 25 U/L (ref 0–45)
BILIRUB SERPL-MCNC: 0.6 MG/DL
BUN SERPL-MCNC: 13.2 MG/DL (ref 6–20)
CALCIUM SERPL-MCNC: 9.2 MG/DL (ref 8.6–10)
CHLORIDE SERPL-SCNC: 107 MMOL/L (ref 98–107)
CHOLEST SERPL-MCNC: 181 MG/DL
CREAT SERPL-MCNC: 0.95 MG/DL (ref 0.67–1.17)
DEPRECATED HCO3 PLAS-SCNC: 25 MMOL/L (ref 22–29)
EGFRCR SERPLBLD CKD-EPI 2021: >90 ML/MIN/1.73M2
ERYTHROCYTE [DISTWIDTH] IN BLOOD BY AUTOMATED COUNT: 13.1 % (ref 10–15)
FASTING STATUS PATIENT QL REPORTED: NO
FASTING STATUS PATIENT QL REPORTED: NO
GLUCOSE SERPL-MCNC: 113 MG/DL (ref 70–99)
HCT VFR BLD AUTO: 44.5 % (ref 40–53)
HDLC SERPL-MCNC: 35 MG/DL
HGB BLD-MCNC: 14.9 G/DL (ref 13.3–17.7)
LDLC SERPL CALC-MCNC: 117 MG/DL
MCH RBC QN AUTO: 29.4 PG (ref 26.5–33)
MCHC RBC AUTO-ENTMCNC: 33.5 G/DL (ref 31.5–36.5)
MCV RBC AUTO: 88 FL (ref 78–100)
NONHDLC SERPL-MCNC: 146 MG/DL
PLATELET # BLD AUTO: 203 10E3/UL (ref 150–450)
POTASSIUM SERPL-SCNC: 3.6 MMOL/L (ref 3.4–5.3)
PROT SERPL-MCNC: 7.5 G/DL (ref 6.4–8.3)
RBC # BLD AUTO: 5.06 10E6/UL (ref 4.4–5.9)
SHBG SERPL-SCNC: 19 NMOL/L (ref 11–80)
SODIUM SERPL-SCNC: 141 MMOL/L (ref 135–145)
TRIGL SERPL-MCNC: 147 MG/DL
TSH SERPL DL<=0.005 MIU/L-ACNC: 1.76 UIU/ML (ref 0.3–4.2)
WBC # BLD AUTO: 5.7 10E3/UL (ref 4–11)

## 2024-05-13 PROCEDURE — 84443 ASSAY THYROID STIM HORMONE: CPT | Performed by: PHYSICIAN ASSISTANT

## 2024-05-13 PROCEDURE — 99396 PREV VISIT EST AGE 40-64: CPT | Mod: 25 | Performed by: PHYSICIAN ASSISTANT

## 2024-05-13 PROCEDURE — 80061 LIPID PANEL: CPT | Performed by: PHYSICIAN ASSISTANT

## 2024-05-13 PROCEDURE — 84403 ASSAY OF TOTAL TESTOSTERONE: CPT | Performed by: PHYSICIAN ASSISTANT

## 2024-05-13 PROCEDURE — 36415 COLL VENOUS BLD VENIPUNCTURE: CPT | Performed by: PHYSICIAN ASSISTANT

## 2024-05-13 PROCEDURE — 90715 TDAP VACCINE 7 YRS/> IM: CPT | Performed by: PHYSICIAN ASSISTANT

## 2024-05-13 PROCEDURE — 85027 COMPLETE CBC AUTOMATED: CPT | Performed by: PHYSICIAN ASSISTANT

## 2024-05-13 PROCEDURE — 90471 IMMUNIZATION ADMIN: CPT | Performed by: PHYSICIAN ASSISTANT

## 2024-05-13 PROCEDURE — 80053 COMPREHEN METABOLIC PANEL: CPT | Performed by: PHYSICIAN ASSISTANT

## 2024-05-13 PROCEDURE — 84270 ASSAY OF SEX HORMONE GLOBUL: CPT | Performed by: PHYSICIAN ASSISTANT

## 2024-05-13 PROCEDURE — 99214 OFFICE O/P EST MOD 30 MIN: CPT | Mod: 25 | Performed by: PHYSICIAN ASSISTANT

## 2024-05-13 SDOH — HEALTH STABILITY: PHYSICAL HEALTH: ON AVERAGE, HOW MANY DAYS PER WEEK DO YOU ENGAGE IN MODERATE TO STRENUOUS EXERCISE (LIKE A BRISK WALK)?: 7 DAYS

## 2024-05-13 ASSESSMENT — PAIN SCALES - GENERAL: PAINLEVEL: MILD PAIN (2)

## 2024-05-13 ASSESSMENT — SOCIAL DETERMINANTS OF HEALTH (SDOH): HOW OFTEN DO YOU GET TOGETHER WITH FRIENDS OR RELATIVES?: NEVER

## 2024-05-13 NOTE — PROGRESS NOTES
"Preventive Care Visit  [facility]  Bran Corea PA-C, Family Medicine  May 13, 2024      Assessment & Plan     Routine history and physical examination of adult  43-year-old male here for routine physical.  Labs pending.  Follow-up based on results.    - CBC with platelets; Future  - Comprehensive metabolic panel (BMP + Alb, Alk Phos, ALT, AST, Total. Bili, TP); Future  - Lipid panel reflex to direct LDL Fasting; Future    Morbid obesity (H)  Has been struggling with weight loss recently.  We do find out that he has been diagnosed with obstructive sleep apnea at one point in time. Do recommend that  Do Recommend That She Be Seen by Sleep Specialist to Work through This Process Further.he be seen by sleep specialist to work through this process further.  - TSH with free T4 reflex; Future  - Testosterone Free and Total; Future    Hyperlipidemia LDL goal <130  Established.  Labs pending.  Follow-up based on results.    Diverticulitis of colon with perforation - historical  KAT (generalized anxiety disorder)  No new concerns with respect to this.  Continues to follow a fairly aggressive diet to try to help and is managing stress well.    Hypogonadism male  - TSH with free T4 reflex; Future  - Testosterone Free and Total; Future    MARCO (obstructive sleep apnea)  Do recommend that he be seen by sleep specialist to work through this process further.  - Adult Sleep Eval & Management  Referral; Future    Patient has been advised of split billing requirements and indicates understanding: Yes    BMI  Estimated body mass index is 41.48 kg/m  as calculated from the following:    Height as of this encounter: 1.79 m (5' 10.47\").    Weight as of this encounter: 132.9 kg (293 lb).   Weight management plan: Discussed healthy diet and exercise guidelines    Counseling  Appropriate preventive services were discussed with this patient, including applicable screening as appropriate for fall prevention, " nutrition, physical activity, Tobacco-use cessation, weight loss and cognition.  Checklist reviewing preventive services available has been given to the patient.  Reviewed patient's diet, addressing concerns and/or questions.   Patient is at risk for social isolation and has been provided with information about the benefit of social connection.   The patient was instructed to see the dentist every 6 months.   He is at risk for psychosocial distress and has been provided with information to reduce risk.       Work on weight loss  Regular exercise    German Moralez is a 43 year old, presenting for the following:  Physical        5/13/2024     7:50 AM   Additional Questions   Roomed by Michelle   Accompanied by Self        Health Care Directive  Patient does not have a Health Care Directive or Living Will: Discussed advance care planning with patient; however, patient declined at this time.    HPI    More fatigued then normal        5/13/2024   General Health   How would you rate your overall physical health? Good   Feel stress (tense, anxious, or unable to sleep) Only a little   (!) STRESS CONCERN      5/13/2024   Nutrition   Three or more servings of calcium each day? Yes   Diet: Gluten-free/reduced   How many servings of fruit and vegetables per day? (!) 2-3   How many sweetened beverages each day? 0-1         5/13/2024   Exercise   Days per week of moderate/strenous exercise 7 days         5/13/2024   Social Factors   Frequency of gathering with friends or relatives Never   Worry food won't last until get money to buy more Yes   Food not last or not have enough money for food? Yes   Do you have housing?  Yes   Are you worried about losing your housing? No   Lack of transportation? No   Unable to get utilities (heat,electricity)? No   (!) FOOD SECURITY CONCERN PRESENT(!) SOCIAL CONNECTIONS CONCERN      5/13/2024   Dental   Dentist two times every year? (!) NO         5/13/2024   TB Screening   Were you born outside  of the [place]? Yes         Today's PHQ-2 Score:       5/13/2024     7:48 AM   PHQ-2 ( 1999 [employer])   Q1: Little interest or pleasure in doing things 0   Q2: Feeling down, depressed or hopeless 0   PHQ-2 Score 0   Q1: Little interest or pleasure in doing things Not at all   Q2: Feeling down, depressed or hopeless Not at all   PHQ-2 Score 0           5/13/2024   Substance Use   Alcohol more than 3/day or more than 7/wk Not Applicable   Do you use any other substances recreationally? No     Social History     Tobacco Use    Smoking status: Never    Smokeless tobacco: Never   Vaping Use    Vaping status: Never Used   Substance Use Topics    Alcohol use: Not Currently     Comment: rare    Drug use: Never           5/13/2024   STI Screening   New sexual partner(s) since last STI/HIV test? No   ASCVD Risk   The 10-year ASCVD risk score (Elfego JEREZ, et al., 2019) is: 1.9%    Values used to calculate the score:      Age: 43 years      Sex: Male      Is Non- : No      Diabetic: No      Tobacco smoker: No      Systolic Blood Pressure: 124 mmHg      Is BP treated: No      HDL Cholesterol: 43 mg/dL      Total Cholesterol: 202 mg/dL       Reviewed and updated as needed this visit by Provider                    Past Medical History:   Diagnosis Date    Diverticulitis of colon     Generalized anxiety disorder     Intramuscular lipoma     Morbid obesity (H)      Past Surgical History:   Procedure Laterality Date    ARTHROSCOPY KNEE WITH MEDIAL MENISCECTOMY  5/7/2014    Procedure: ARTHROSCOPY KNEE WITH MEDIAL MENISCECTOMY;  Surgeon: Artis Dougherty MD;  Location: [facility]    COLONOSCOPY N/A 3/30/2021    Procedure: Colonoscopy, With Polypectomy And Biopsy;  Surgeon: Daniel Doll MD;  Location: [facility] OR    COLONOSCOPY WITH CO2 INSUFFLATION N/A 3/30/2021    Procedure: COLONOSCOPY, WITH CO2 INSUFFLATION;  Surgeon: Daniel Doll MD;  Location: [facility] OR    EXCISE MASS LOWER EXTREMITY Left  11/3/2022    Procedure: Excision left hip mass;  Surgeon: Surjit Cavanaugh MD;  Location: [facility] OR    NO HISTORY OF SURGERY       Lab work is in process  Labs reviewed in EPIC  BP Readings from Last 3 Encounters:   05/13/24 124/86   08/31/23 123/88   11/03/22 119/74    Wt Readings from Last 3 Encounters:   05/13/24 132.9 kg (293 lb)   11/03/22 128.4 kg (283 lb)   09/07/22 127.9 kg (282 lb)                  Patient Active Problem List   Diagnosis    Hypogonadism male    Tear of medial cartilage or meniscus of knee, current    Diverticulitis of colon with perforation    Morbid obesity (H)    KAT (generalized anxiety disorder)    Intramuscular lipoma    Hyperlipidemia LDL goal <130    MARCO (obstructive sleep apnea)     Past Surgical History:   Procedure Laterality Date    ARTHROSCOPY KNEE WITH MEDIAL MENISCECTOMY  5/7/2014    Procedure: ARTHROSCOPY KNEE WITH MEDIAL MENISCECTOMY;  Surgeon: Artis Dougherty MD;  [facility]    COLONOSCOPY N/A 3/30/2021    Procedure: Colonoscopy, With Polypectomy And Biopsy;  Surgeon: Daniel Doll MD;  [facility]    COLONOSCOPY WITH CO2 INSUFFLATION N/A 3/30/2021    Procedure: COLONOSCOPY, WITH CO2 INSUFFLATION;  Surgeon: Daniel Doll MD;  Location: [facility]    EXCISE MASS LOWER EXTREMITY Left 11/3/2022    Procedure: Excision left hip mass;  Surgeon: Surjit Cavanaugh MD;  Location: [facility] OR    NO HISTORY OF SURGERY         Social History     Tobacco Use    Smoking status: Never    Smokeless tobacco: Never   Substance Use Topics    Alcohol use: Not Currently     Comment: rare     Family History   Problem Relation Age of Onset    Hypertension Mother     Thyroid Disease Mother         Thyroidectomy-CA    Diabetes Mother     C.A.D. Maternal Grandmother         hx of bypass    Family History Negative No family hx of     Anesthesia Reaction No family hx of     Deep Vein Thrombosis (DVT) No family hx of          No current outpatient medications on  "file.     No Known Allergies  Recent Labs   Lab Test 08/31/23  1135 08/17/21  1339 07/29/19  1834   A1C  --  5.4  --    LDL  --  128*  --    HDL  --  43  --    TRIG  --  154*  --    ALT 70  --   --    CR 1.74*  --   --    GFRESTIMATED 49*  --   --    POTASSIUM 4.1  --   --    TSH  --   --  1.52          Review of Systems  Constitutional, HEENT, cardiovascular, pulmonary, GI, , musculoskeletal, neuro, skin, endocrine and psych systems are negative, except as otherwise noted.     Objective    Exam  /86   Pulse 82   Temp 97.9  F (36.6  C) (Temporal)   Resp 18   Ht 1.79 m (5' 10.47\")   Wt 132.9 kg (293 lb)   SpO2 97%   BMI 41.48 kg/m     Estimated body mass index is 41.48 kg/m  as calculated from the following:    Height as of this encounter: 1.79 m (5' 10.47\").    Weight as of this encounter: 132.9 kg (293 lb).    Physical Exam  GENERAL: alert and no distress  EYES: Eyes grossly normal to inspection, PERRL and conjunctivae and sclerae normal  HENT: ear canals and TM's normal, nose and mouth without ulcers or lesions  NECK: no adenopathy, no asymmetry, masses, or scars  RESP: lungs clear to auscultation - no rales, rhonchi or wheezes  CV: regular rate and rhythm, normal S1 S2, no S3 or S4, no murmur, click or rub, no peripheral edema  ABDOMEN: soft, nontender, no hepatosplenomegaly, no masses and bowel sounds normal  MS: no gross musculoskeletal defects noted, no edema  SKIN: no suspicious lesions or rashes  NEURO: Normal strength and tone, mentation intact and speech normal  PSYCH: mentation appears normal, affect normal/bright        Signed Electronically by: Bran Corea PA-C    "

## 2024-05-13 NOTE — COMMUNITY RESOURCES LIST (ENGLISH)
May 13, 2024           YOUR PERSONALIZED LIST OF SERVICES & PROGRAMS           NAVIGATION    Eligibility Screening      [facility] application assistance  [address] [facility] [address] [place] [address] (Distance: 14.5 miles)  Phone: [phone]  Language: English, Niuean, Canadian, Citizen of [place]  Fee: Free  Accessibility: Deaf or hard of hearing, Blind accommodation, Ada accessible, Translation services      [facility]  [address] [facility] [address] [place] [address] (Distance: 14.5 miles)  Phone: [phone]  Language: English, Niuean, Canadian, Citizen of [place]  Fee: Free  Accessibility: Deaf or hard of hearing, Blind accommodation, Ada accessible, Translation services      [employer] [place] - SNAP (formerly food stamps) Screening and Application help  Phone: [phone]) [phone]  Website: https://www.Writer.ly.org/programs/[place]-food-helpline/  Language: English  Hours: Mon 10:00 AM - 5:00 PM Tue 10:00 AM - 5:00 PM Wed 10:00 AM - 5:00 PM Thu 10:00 AM - 5:00 PM Fri 10:00 AM - 5:00 PM  Fee: Free  Accessibility: Ada accessible, Blind accommodation, Deaf or hard of hearing, Translation services        ASSISTANCE    Nutrition Benefits      [facility] application assistance  [address] [address] (Distance: 14.5 miles)  Phone: [phone]  Language: English, Niuean, Canadian, Citizen of [place]  Fee: Free  Accessibility: Deaf or hard of hearing, Blind accommodation, Ada accessible, Translation services      [facility] application assistance  [address] [place] [address] (Distance: 20.7 miles)  Phone: [phone]  Website: https://www.Concept Inbox/  Language: English  Fee: Free  Accessibility: Ada accessible      [employer] [place] - SNAP (formerly food stamps) Screening and Application help  Phone: (521) 701-9339  Website: https://www.Writer.ly.org/programs/[place]-food-helpline/  Language: English   Hours: Mon 10:00 AM - 5:00 PM Tue 10:00 AM - 5:00 PM Wed 10:00 AM - 5:00 PM Thu 10:00 AM - 5:00 PM Fri 10:00 AM - 5:00 PM  Fee: Free  Accessibility: Ada accessible, Blind accommodation, Deaf or hard of hearing, Translation services    Pantry      [facility] Food [facility] - Fresh fruits and vegetables  [address] [place] [address] (Distance: 9.4 miles)  Website: https://Bonaire Dreamss.[employer]/  Language: English  Fee: Free  Accessibility: Translation services, Ada accessible      [facility] [place] ([employer]) - Food pantry  [address] [place] [address] (Distance: 4.0 miles)  Phone: [phone]) [phone]  Website: http://wwwFashion Project  Language: English, Maltese  Fee: Free  Accessibility: Deaf or hard of hearing, Blind accommodation, Translation services      [employer] - [place] [employer] Shel[employer]  Phone: ([phone]) [phone]  Website: wwwTails.com  Language: English, Maltese  Hours: Mon 9:00 AM - 3:30 PM Tue 9:00 AM - 6:30 PM Wed 9:00 AM - 3:30 PM Thu 9:00 AM - 12:30 PM Fri 9:00 AM - 12:30 PM Sat 9:00 AM - 12:00 PM  Fee: Free               IMPORTANT NUMBERS & WEBSITES        Emergency Services  911  .   [facility]  211 http://211unitedway.org  .   Poison Control  (263) 601-3683 http://[place]poison.org http://wisconsinpoison.org  .     Suicide and Crisis Lifeline  988 http://988lifeline.org  .   Childhelp National Child Abuse Hotline  747.170.5661 http://Childhelphotline.org   .   National Sexual Assault Hotline  (911) 310-8636 (HOPE) http://Rainn.org   .     National Runaway Safeline  (514) 912-3616 (RUNAWAY) http://EcoSynthetixrunaDataNitro.org  .   Pregnancy & Postpartum Support  Call/text 200-493-4867  [place]: http://ppsupportmn.org  [place]: http://psichapters.com/wi  .   Substance Abuse National Helpline ([facility])  800-622-HELP (5878) http://Findtreatment.gov   .                DISCLAIMER: These resources have been generated via the [employer] Platform. [employer] does not endorse any service providers mentioned  in this resource list. [employer] does not guarantee that the services mentioned in this resource list will be available to you or will improve your health or wellness.    [facility]

## 2024-05-15 LAB
TESTOST FREE SERPL-MCNC: 4.41 NG/DL
TESTOST SERPL-MCNC: 176 NG/DL (ref 240–950)
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
## 2024-05-17 ENCOUNTER — VIRTUAL VISIT (OUTPATIENT)
Dept: SLEEP MEDICINE | Facility: CLINIC | Age: 44
End: 2024-05-17
Payer: COMMERCIAL

## 2024-05-17 VITALS — HEIGHT: 73 IN | BODY MASS INDEX: 39.1 KG/M2 | WEIGHT: 295 LBS

## 2024-05-17 DIAGNOSIS — R40.0 SLEEPINESS: ICD-10-CM

## 2024-05-17 DIAGNOSIS — F51.04 CHRONIC INSOMNIA: ICD-10-CM

## 2024-05-17 DIAGNOSIS — R06.83 SNORING: Primary | ICD-10-CM

## 2024-05-17 PROCEDURE — 99244 OFF/OP CNSLTJ NEW/EST MOD 40: CPT | Mod: 95 | Performed by: PHYSICIAN ASSISTANT

## 2024-05-17 ASSESSMENT — SLEEP AND FATIGUE QUESTIONNAIRES
HOW LIKELY ARE YOU TO NOD OFF OR FALL ASLEEP WHILE SITTING INACTIVE IN A PUBLIC PLACE: WOULD NEVER DOZE
HOW LIKELY ARE YOU TO NOD OFF OR FALL ASLEEP WHILE LYING DOWN TO REST IN THE AFTERNOON WHEN CIRCUMSTANCES PERMIT: HIGH CHANCE OF DOZING
HOW LIKELY ARE YOU TO NOD OFF OR FALL ASLEEP WHILE SITTING AND READING: HIGH CHANCE OF DOZING
HOW LIKELY ARE YOU TO NOD OFF OR FALL ASLEEP WHILE SITTING QUIETLY AFTER LUNCH WITHOUT ALCOHOL: WOULD NEVER DOZE
HOW LIKELY ARE YOU TO NOD OFF OR FALL ASLEEP WHILE WATCHING TV: HIGH CHANCE OF DOZING
HOW LIKELY ARE YOU TO NOD OFF OR FALL ASLEEP WHILE SITTING AND TALKING TO SOMEONE: WOULD NEVER DOZE
HOW LIKELY ARE YOU TO NOD OFF OR FALL ASLEEP IN A CAR, WHILE STOPPED FOR A FEW MINUTES IN TRAFFIC: WOULD NEVER DOZE
HOW LIKELY ARE YOU TO NOD OFF OR FALL ASLEEP WHEN YOU ARE A PASSENGER IN A CAR FOR AN HOUR WITHOUT A BREAK: WOULD NEVER DOZE

## 2024-05-17 ASSESSMENT — PAIN SCALES - GENERAL: PAINLEVEL: MODERATE PAIN (4)

## 2024-05-17 NOTE — NURSING NOTE
Is the patient currently in the state of [place]? YES    Visit mode:VIDEO    If the visit is dropped, the patient can be reconnected by: VIDEO VISIT: Text to cell phone:   Telephone Information:   Mobile [phone]       Will anyone else be joining the visit? NO  (If patient encounters technical issues they should call [phone])    How would you like to obtain your AVS? MyChart    Are changes needed to the allergy or medication list? No    Are refills needed on medications prescribed by this physician? NO    Reason for visit: Consult    Vijay SHORT

## 2024-05-17 NOTE — PROGRESS NOTES
Virtual Visit Details    Type of service:  Video Visit   Start Time: 10:46 AM   End Time: 11:26 AM    Originating Location (pt. Location): Home    Distant Location (provider location):  Off-site  Platform used for Video Visit: Ingenic

## 2024-05-17 NOTE — PATIENT INSTRUCTIONS
"          MY TREATMENT INFORMATION FOR SLEEP APNEA-  Kirt Burt    DOCTOR : Bennett Goltz, PA-C    Am I having a sleep study at a sleep center?  --->Due to normal delays, you will be contacted within 2-4 weeks to schedule    Am I having a home sleep study?  --->Watch the video for the device you are using:    -/drop off device-   https://www.Telik.com/watch?v=yGGFBdELGhk    Frequently asked questions:  1. What is Obstructive Sleep Apnea (MARCO)? MARCO is the most common type of sleep apnea. Apnea means, \"without breath.\"  Apnea is most often caused by narrowing or collapse of the upper airway as muscles relax during sleep.   Almost everyone has occasional apneas. Most people with sleep apnea have had brief interruptions at night frequently for many years.  The severity of sleep apnea is related to how frequent and severe the events are.   2. What are the consequences of MARCO? Symptoms include: feeling sleepy during the day, snoring loudly, gasping or stopping of breathing, trouble sleeping, and occasionally morning headaches or heartburn at night.  Sleepiness can be serious and even increase the risk of falling asleep while driving. Other health consequences may include development of high blood pressure and other cardiovascular disease in persons who are susceptible. Untreated MARCO  can contribute to heart disease, stroke and diabetes.   3. What are the treatment options? In most situations, sleep apnea is a lifelong disease that must be managed with daily therapy. Medications are not effective for sleep apnea and surgery is generally not considered until other therapies have been tried. Your treatment is your choice . Continuous Positive Airway (CPAP) works right away and is the therapy that is effective in nearly everyone. An oral device to hold your jaw forward is usually the next most reliable option. Other options include postioning devices (to keep you off your back), weight loss, and surgery " including a tongue pacing device. There is more detail about some of these options below.  4. Are my sleep studies covered by insurance? Although we will request verification of coverage, we advise you also check in advance of the study to ensure there is coverage.    Important tips for those choosing CPAP and similar devices  REMEMBER-IF YOU RECEIVE A CALL FROM  [phone]-->IT IS TO SETUP A DEVICE  For new devices, sign up for device RIAZ to monitor your device for your followup visits  We encourage you to utilize the [employer] riaz or website ( https://[employer]/ ) to monitor your therapy progress and share the data with your healthcare team when you discuss your sleep apnea.                                                    Know your equipment:  CPAP is continuous positive airway pressure that prevents obstructive sleep apnea by keeping the throat from collapsing while you are sleeping. In most cases, the device is  smart  and can slowly self-adjusts if your throat collapses and keeps a record every day of how well you are treated-this information is available to you and your care team.  BPAP is bilevel positive airway pressure that keeps your throat open and also assists each breath with a pressure boost to maintain adequate breathing.  Special kinds of BPAP are used in patients who have inadequate breathing from lung or heart disease. In most cases, the device is  smart  and can slowly self-adjusts to assist breathing. Like CPAP, the device keeps a record of how well you are treated.  Your mask is your connection to the device. You get to choose what feels most comfortable and the staff will help to make sure if fits. Here: are some examples of the different masks that are available: Magnetic mask aids may assist with use but there are safety issues that should be addressed when considering with magnets* ( see end of discussion).       Key points to remember on your journey with sleep  apnea:  Sleep study.  PAP devices often need to be adjusted during a sleep study to show that they are effective and adjusted right.  Good tips to remember: Try wearing just the mask during a quiet time during the day so your body adapts to wearing it. A humidifier is recommended for comfort in most cases to prevent drying of your nose and throat. Allergy medication from your provider may help you if you are having nasal congestion.  Getting settled-in. It takes more than one night for most of us to get used to wearing a mask. Try wearing just the mask during a quiet time during the day so your body adapts to wearing it. A humidifier is recommended for comfort in most cases. Our team will work with you carefully on the first day and will be in contact within 4 days and again at 2 and 4 weeks for advice and remote device adjustments. Your therapy is evaluated by the device each day.   Use it every night. The more you are able to sleep naturally for 7-8 hours, the more likely you will have good sleep and to prevent health risks or symptoms from sleep apnea. Even if you use it 4 hours it helps. Occasionally all of us are unable to use a medical therapy, in sleep apnea, it is not dangerous to miss one night.   Communicate. Call our skilled team on the number provided on the first day if your visit for problems that make it difficult to wear the device. Over 2 out of 3 patients can learn to wear the device long-term with help from our team. Remember to call our team or your sleep providers if you are unable to wear the device as we may have other solutions for those who cannot adapt to mask CPAP therapy. It is recommended that you sleep your sleep provider within the first 3 months and yearly after that if you are not having problems.   Use it for your health. We encourage use of CPAP masks during daytime quiet periods to allow your face and brain to adapt to the sensation of CPAP so that it will be a more natural  sensation to awaken to at night or during naps. This can be very useful during the first few weeks or months of adapting to CPAP though it does not help medically to wear CPAP during wakefulness and  should not be used as a strategy just to meet guidelines.  Take care of your equipment. Make sure you clean your mask and tubing using directions every day and that your filter and mask are replaced as recommended or if they are not working.     *Masks with magnets:  Updated Contraindications  Masks with magnetic components are contraindicated for use by patients where they, or anyone in close physical contact while using the mask, have the following:   Active medical implants that interact with magnets (i.e., pacemakers, implantable cardioverter defibrillators (ICD), neurostimulators, cerebrospinal fluid (CSF) shunts, insulin/infusion pumps)   Metallic implants/objects containing ferromagnetic material (i.e., aneurysm clips/flow disruption devices, embolic coils, stents, valves, electrodes, implants to restore hearing or balance with implanted magnets, ocular implants, metallic splinters in the eye)  Updated Warning  Keep the mask magnets at a safe distance of at least 6 inches (150 mm) away from implants or medical devices that may be adversely affected by magnetic interference. This warning applies to you or anyone in close physical contact with your mask. The magnets are in the frame and lower headgear clips, with a magnetic field strength of up to 400mT. When worn, they connect to secure the mask but may inadvertently detach while asleep.  Implants/medical devices, including those listed within contraindications, may be adversely affected if they change function under external magnetic fields or contain ferromagnetic materials that attract/repel to magnetic fields (some metallic implants, e.g., contact lenses with metal, dental implants, metallic cranial plates, screws, nela hole covers, and bone substitute  devices). Consult your physician and  of your implant / other medical device for information on the potential adverse effects of magnetic fields.    BESIDES CPAP, WHAT OTHER THERAPIES ARE THERE?    Positioning Device  Positioning devices are generally used when sleep apnea is mild and only occurs on your back.This example shows a pillow that straps around the waist. It may be appropriate for those whose sleep study shows milder sleep apnea that occurs primarily when lying flat on one's back. Preliminary studies have shown benefit but effectiveness at home may need to be verified by a home sleep test. These devices are generally not covered by medical insurance.  Examples of devices that maintain sleeping on the back to prevent snoring and mild sleep apnea.    Belt type body positioner  http://[employer]/    Electronic reminder  http://nightshifttherapy.com/            Oral Appliance  What is oral appliance therapy?  An oral appliance device fits on your teeth at night like a retainer used after having braces. The device is made by a specialized dentist and requires several visits over 1-2 months before a manufactured device is made to fit your teeth and is adjusted to prevent your sleep apnea. Once an oral device is working properly, snoring should be improved. A home sleep test may be recommended at that time if to determine whether the sleep apnea is adequately treated.       Some things to remember:  -Oral devices are often, but not always, covered by your medical insurance. Be sure to check with your insurance provider.   -If you are referred for oral therapy, you will be given a list of specialized dentists to consider or you may choose to visit the Web site of the American Academy of Dental Sleep Medicine  -Oral devices are less likely to work if you have severe sleep apnea or are extremely overweight.     More detailed information  An oral appliance is a small acrylic device that fits over the  upper and lower teeth  (similar to a retainer or a mouth guard). This device slightly moves jaw forward, which moves the base of the tongue forward, opens the airway, improves breathing for effective treat snoring and obstructive sleep apnea in perhaps 7 out of 10 people .  The best working devices are custom-made by a dental device  after a mold is made of the teeth 1, 2, 3.  When is an oral appliance indicated?  Oral appliance therapy is recommended as a first-line treatment for patients with primary snoring, mild sleep apnea, and for patients with moderate sleep apnea who prefer appliance therapy to use of CPAP4, 5. Severity of sleep apnea is determined by sleep testing and is based on the number of respiratory events per hour of sleep.   How successful is oral appliance therapy?  The success rate of oral appliance therapy in patients with mild sleep apnea is 75-80% while in patients with moderate sleep apnea it is 50-70%. The chance of success in patients with severe sleep apnea is 40-50%. The research also shows that oral appliances have a beneficial effect on the cardiovascular health of MARCO patients at the same magnitude as CPAP therapy7.  Oral appliances should be a second-line treatment in cases of severe sleep apnea, but if not completely successful then a combination therapy utilizing CPAP plus oral appliance therapy may be effective. Oral appliances tend to be effective in a broad range of patients although studies show that the patients who have the highest success are females, younger patients, those with milder disease, and less severe obesity. 3, 6.   Finding a dentist that practices dental sleep medicine  Specific training is available through the American Academy of Dental Sleep Medicine for dentists interested in working in the field of sleep. To find a dentist who is educated in the field of sleep and the use of oral appliances, near you, visit the Web site of the American Academy of  Dental Sleep Medicine.    References  1. Diony et al. Objectively measured vs self-reported compliance during oral appliance therapy for sleep-disordered breathing. Chest 2013; 144(5): 9718-4147.  2. Diamond et al. Objective measurement of compliance during oral appliance therapy for sleep-disordered breathing. Thorax 2013; 68(1): 91-96.  3. Kalyani, et al. Mandibular advancement devices in 620 men and women with MARCO and snoring: tolerability and predictors of treatment success. Chest 2004; 125: 9389-2760.  4. Gage et al. Oral appliances for snoring and MARCO: a review. Sleep 2006; 29: 244-262.  5. Nery et al. Oral appliance treatment for MARCO: an update. J Clin Sleep Med 2014; 10(2): 215-227.  6. Tio et al. Predictors of OSAH treatment outcome. J Dent Res 2007; 86: 6838-9968.      Weight Loss:   Your Body mass index is 38.92 kg/m .    Being overweight does not necessarily mean you will have health consequences.  Those who have BMI over 35 or over 27 with existing medical conditions carries greater risk.   Weight loss decreases severity of sleep apnea in most people with obesity. For those with mild obesity who have developed snoring with weight gain, even 15-30 pound weight loss can improve and occasionally milder eliminate sleep apnea.  Structured and life-long dietary and health habits are necessary to lose weight and keep healthier weight levels.     The Comprehensive Weight loss program offers all aspects of weight loss strategies including two Non-Surgical Weight Loss Programs: Medical Weight Management and our 24 Week Healthy Lifestyle Program:    Medical Weight Management: You will meet with a Medical Weight Management Provider, as well as a Registered Dietician. The program may include medication therapy, dietary education, recommended exercise and physical therapy programs, monthly support group meetings, and possible psychological counseling. Follow up visits with the provider or  dietician are scheduled based on your progress and needs.    24 Week Healthy Lifestyle Program: This unique program is designed to give you the support of weekly appointments and activities thru a 24-week period. It may include all of the components of the basic program (above), with the addition of 11 individual Health  Visits, 24-week access to the [employer] website for over 700 online classes, and monthly support group meetings. This program has an out-of-pocket expense of $499 to cover the items that can not be billed to insurance (health coaches and [employer] access), and is non-refundable/non-transferable (you may be able to use a Health Savings Account; ask your HSA provider). There may be an optional meal replacement plan prescribed as well.   Surgical management achieves meaningful long-term weight loss and improvement in health risks in most patients with more severe obesity.      Sleep Apnea Surgery:    Surgery for obstructive sleep apnea is considered generally only when other therapies fail to work. Surgery may be discussed with you if you are having a difficult time tolerating CPAP and or when there is an abnormal structure that requires surgical correction.  Nose and throat surgeries often enlarge the airway to prevent collapse.  Most of these surgeries create pain for 1-2 weeks and up to half of the most common surgeries are not effective throughout life.  You should carefully discuss the benefits and drawbacks to surgery with your sleep provider and surgeon to determine if it is the best solution for you.   More information  Surgery for MARCO is directed at areas that are responsible for narrowing or complete obstruction of the airway during sleep.  There are a wide range of procedures available to enlarge and/or stabilize the airway to prevent blockage of breathing in the three major areas where it can occur: the palate, tongue, and nasal regions.  Successful surgical treatment depends on the  accurate identification of the factors responsible for obstructive sleep apnea in each person.  A personalized approach is required because there is no single treatment that works well for everyone.  Because of anatomic variation, consultation with an examination by a sleep surgeon is a critical first step in determining what surgical options are best for each patient.  In some cases, examination during sedation may be recommended in order to guide the selection of procedures.  Patients will be counseled about risks and benefits as well as the typical recovery course after surgery. Surgery is typically not a cure for a person s MARCO.  However, surgery will often significantly improve one s MARCO severity (termed  success rate ).  Even in the absence of a cure, surgery will decrease the cardiovascular risk associated with OSA7; improve overall quality of life8 (sleepiness, functionality, sleep quality, etc).  Palate Procedures:  Patients with MARCO often have narrowing of their airway in the region of their tonsils and uvula.  The goals of palate procedures are to widen the airway in this region as well as to help the tissues resist collapse.  Modern palate procedure techniques focus on tissue conservation and soft tissue rearrangement, rather than tissue removal.  Often the uvula is preserved in this procedure. Residual sleep apnea is common in patient after pharyngoplasty with an average reduction in sleep apnea events of 33%2.    Tongue Procedures:  ExamWhile patients are awake, the muscles that surround the throat are active and keep this region open for breathing. These muscles relax during sleep, allowing the tongue and other structures to collapse and block breathing.  There are several different tongue procedures available.  Selection of a tongue base procedure depends on characteristics seen on physical exam.  Generally, procedures are aimed at removing bulky tissues in this area or preventing the back of the  tongue from falling back during sleep.  Success rates for tongue surgery range from 50-62%3.  Hypoglossal Nerve Stimulation:  Hypoglossal nerve stimulation has recently received approval from the [place] Food and Drug Administration for the treatment of obstructive sleep apnea.  This is based on research showing that the system was safe and effective in treating sleep apnea6.  Results showed that the median AHI score decreased 68%, from 29.3 to 9.0. This therapy uses an implant system that senses breathing patterns and delivers mild stimulation to airway muscles, which keeps the airway open during sleep.  The system consists of three fully implanted components: a small generator (similar in size to a pacemaker), a breathing sensor, and a stimulation lead.  Using a small handheld remote, a patient turns the therapy on before bed and off upon awakening.    Candidates for this device must be greater than 18 years of age, have moderate to severe obstructive sleep apnea with less than 25% central events  (AHI between 15-65), BMI less than 35, have tried CPAP/oral appliance for at least 8 weeks without success, and have appropriate upper airway anatomy (determined by a sleep endoscopy performed by Dr. Doug Morrison or Dr. Bassam Lester).  Nasal Procedures:  Nasal obstruction can interfere with nasal breathing during the day and night.  Studies have shown that relief of nasal obstruction can improve the ability of some patients to tolerate positive airway pressure therapy for obstructive sleep apnea1.  Treatment options include medications such as nasal saline, topical corticosteroid and antihistamine sprays, and oral medications such as antihistamines or decongestants. Non-surgical treatments can include external nasal dilators for selected patients. If these are not successful by themselves, surgery can improve the nasal airway either alone or in combination with these other options.        Combination  Procedures:  Combination of surgical procedures and other treatments may be recommended, particularly if patients have more than one area of narrowing or persistent positional disease.  The success rate of combination surgery ranges from 66-80%2,3.    References  Bipin VAZ. The Role of the Nose in Snoring and Obstructive Sleep Apnoea: An Update.  Eur Arch Otorhinolaryngol. 2011; 268: 1365-73.   Flor SM; Tammie JA; Yenny JR; Pallanch JF; Lynne MB; Geeta SG; Benjamin CHAMBERS. Surgical modifications of the upper airway for obstructive sleep apnea in adults: a systematic review and meta-analysis. SLEEP 2010;33(10):8562-7641. Garcia MORALES. Hypopharyngeal surgery in obstructive sleep apnea: an evidence-based medicine review.  Arch Otolaryngol Head Neck Surg. 2006 Feb;132(2):206-13.  Clayton YH1, Kenny Y, Zen REN. The efficacy of anatomically based multilevel surgery for obstructive sleep apnea. Otolaryngol Head Neck Surg. 2003 Oct;129(4):327-35.  Kezirian E, Goldberg A. Hypopharyngeal Surgery in Obstructive Sleep Apnea: An Evidence-Based Medicine Review. Arch Otolaryngol Head Neck Surg. 2006 Feb;132(2):206-13.  Lazaro PJ et al. Upper-Airway Stimulation for Obstructive Sleep Apnea.  N Engl J Med. 2014 Jan 9;370(2):139-49.  Nisha Y et al. Increased Incidence of Cardiovascular Disease in Middle-aged Men with Obstructive Sleep Apnea. Am J Respir Crit Care Med; 2002 166: 159-165  Benítez EM et al. Studying Life Effects and Effectiveness of Palatopharyngoplasty (SLEEP) study: Subjective Outcomes of Isolated Uvulopalatopharyngoplasty. Otolaryngol Head Neck Surg. 2011; 144: 623-631.    WHAT IF I ONLY HAVE SNORING?    Mandibular advancement devices, lateral sleep positioning, long-term weight loss and treatment of nasal allergies have been shown to improve snoring.  Exercising tongue muscles with a game (https://apps.[employer]/us/riaz/soundly-reduce-snoring/ka0673704360) or stimulating the tongue during the day with a device  (https://doi.org/10.3390/gxw82607484) have improved snoring in some individuals.  https://www.[employer].com/  https://www.sleepfoundation.org/best-anti-snoring-mouthpieces-and-mouthguards    Remember to Drive Safe... Drive Alive     Sleep health profoundly affects your health, mood, and your safety.  Thirty three percent of the population (one in three of us) is not getting enough sleep and many have a sleep disorder. Not getting enough sleep or having an untreated / undertreated sleep condition may make us sleepy without even knowing it. In fact, our driving could be dramatically impaired due to our sleep health. As your provider, here are some things I would like you to know about driving:     Here are some warning signs for impairment and dangerous drowsy driving:              -Having been awake more than 16 hours               -Looking tired               -Eyelid drooping              -Head nodding (it could be too late at this point)              -Driving for more than 30 minutes     Some things you could do to make the driving safer if you are experiencing some drowsiness:              -Stop driving and rest              -Call for transportation              -Make sure your sleep disorder is adequately treated     Some things that have been shown NOT to work when experiencing drowsiness while driving:              -Turning on the radio              -Opening windows              -Eating any  distracting  /  entertaining  foods (e.g., sunflower seeds, candy, or any other)              -Talking on the phone      Your decision may not only impact your life, but also the life of others. Please, remember to drive safe for yourself and all of us.

## 2024-05-22 ENCOUNTER — MYC MEDICAL ADVICE (OUTPATIENT)
Dept: FAMILY MEDICINE | Facility: OTHER | Age: 44
End: 2024-05-22
Payer: COMMERCIAL

## 2024-05-22 DIAGNOSIS — R79.89 LOW TESTOSTERONE IN MALE: Primary | ICD-10-CM

## 2024-05-23 ENCOUNTER — MYC MEDICAL ADVICE (OUTPATIENT)
Dept: FAMILY MEDICINE | Facility: OTHER | Age: 44
End: 2024-05-23
Payer: COMMERCIAL

## 2024-05-23 ENCOUNTER — TELEPHONE (OUTPATIENT)
Dept: SLEEP MEDICINE | Facility: CLINIC | Age: 44
End: 2024-05-23
Payer: COMMERCIAL

## 2024-05-23 DIAGNOSIS — R79.89 LOW TESTOSTERONE IN MALE: Primary | ICD-10-CM

## 2024-05-23 RX ORDER — TESTOSTERONE CYPIONATE 1000 MG/10ML
50 INJECTION, SOLUTION INTRAMUSCULAR
Qty: 10 ML | Refills: 1 | Status: SHIPPED | OUTPATIENT
Start: 2024-05-23

## 2024-05-23 NOTE — TELEPHONE ENCOUNTER
See my chart encounter from 5/22/24 - coordinating injection teaching with RN.     Trang Paredes BSN, RN

## 2024-05-23 NOTE — TELEPHONE ENCOUNTER
Reason for Call:  Appointment Request    Patient requesting this type of appt:  no appts in epic for drop off on 6.4.24. Adv Pt that appts tend to be around 7:45 - 8:30 AM     Requested provider:  [facility] Drop [facility]    Reason patient unable to be scheduled: Needs to be scheduled by clinic    When does patient want to be seen/preferred time:  6.4.24    Comments: na    Could we send this information to you in [facility] or would you prefer to receive a phone call?:   Patient would prefer a phone call   Okay to leave a detailed message?: Yes at Cell number on file:    Telephone Information:   Mobile [phone]       Call taken on 5/23/2024 at 8:32 AM by Kristan Alex

## 2024-05-29 NOTE — TELEPHONE ENCOUNTER
Drop off appointment made for the patient    Oralia Montemayor (Yamilet) CMA, HST Specialist  [place] / [facility]

## 2024-06-03 ENCOUNTER — TELEPHONE (OUTPATIENT)
Dept: SLEEP MEDICINE | Facility: CLINIC | Age: 44
End: 2024-06-03

## 2024-06-03 NOTE — TELEPHONE ENCOUNTER
Reason for Call:  Appointment Request    Patient requesting this type of appt: Chronic Diease Management/Medication/Follow-Up    Requested provider:  Goltz    Reason patient unable to be scheduled: Not within requested timeframe    When does patient want to be seen/preferred time:  2 weeks past 7/17    Comments:     Could we send this information to you in [employer] or would you prefer to receive a phone call?:   No preference   Okay to leave a detailed message?: Yes at Cell number on file:    Telephone Information:   Mobile [phone]       Call taken on 6/3/2024 at 3:47 PM by Angela Campbell

## 2024-06-06 NOTE — TELEPHONE ENCOUNTER
Detailed message left advising patient to send [employer] message after completing study. Provider will send results by [employer].    Veronica FREEMAN RN  [facility]

## 2024-07-11 ENCOUNTER — TELEPHONE (OUTPATIENT)
Dept: SLEEP MEDICINE | Facility: CLINIC | Age: 44
End: 2024-07-11
Payer: COMMERCIAL

## 2024-07-11 NOTE — TELEPHONE ENCOUNTER
Patient called and stated that he received a letter from his insurance company that the study ordered by the provider is not covered and wanted to know what was the next step. I reviewed the patients chart and see that a auth for the [employer] was approved for the dates of may - Aug. Patient decided that he will keep the appointment and call his insurance to verify it is covered. Caller had no other questions.  Oralia Woodard CMA, HST Specialist  [place] [facility]

## 2024-07-16 ASSESSMENT — SLEEP AND FATIGUE QUESTIONNAIRES
HOW LIKELY ARE YOU TO NOD OFF OR FALL ASLEEP IN A CAR, WHILE STOPPED FOR A FEW MINUTES IN TRAFFIC: WOULD NEVER DOZE
HOW LIKELY ARE YOU TO NOD OFF OR FALL ASLEEP WHILE SITTING QUIETLY AFTER LUNCH WITHOUT ALCOHOL: SLIGHT CHANCE OF DOZING
HOW LIKELY ARE YOU TO NOD OFF OR FALL ASLEEP WHEN YOU ARE A PASSENGER IN A CAR FOR AN HOUR WITHOUT A BREAK: HIGH CHANCE OF DOZING
HOW LIKELY ARE YOU TO NOD OFF OR FALL ASLEEP WHILE SITTING INACTIVE IN A PUBLIC PLACE: MODERATE CHANCE OF DOZING
HOW LIKELY ARE YOU TO NOD OFF OR FALL ASLEEP WHILE SITTING AND TALKING TO SOMEONE: WOULD NEVER DOZE
HOW LIKELY ARE YOU TO NOD OFF OR FALL ASLEEP WHILE LYING DOWN TO REST IN THE AFTERNOON WHEN CIRCUMSTANCES PERMIT: HIGH CHANCE OF DOZING
HOW LIKELY ARE YOU TO NOD OFF OR FALL ASLEEP WHILE SITTING AND READING: MODERATE CHANCE OF DOZING
HOW LIKELY ARE YOU TO NOD OFF OR FALL ASLEEP WHILE WATCHING TV: HIGH CHANCE OF DOZING

## 2024-07-18 ENCOUNTER — OFFICE VISIT (OUTPATIENT)
Dept: SLEEP MEDICINE | Facility: CLINIC | Age: 44
End: 2024-07-18
Attending: PHYSICIAN ASSISTANT
Payer: COMMERCIAL

## 2024-07-18 DIAGNOSIS — R40.0 SLEEPINESS: ICD-10-CM

## 2024-07-18 DIAGNOSIS — R06.83 SNORING: ICD-10-CM

## 2024-07-18 PROCEDURE — G0399 HOME SLEEP TEST/TYPE 3 PORTA: HCPCS | Performed by: INTERNAL MEDICINE

## 2024-07-19 ENCOUNTER — DOCUMENTATION ONLY (OUTPATIENT)
Dept: SLEEP MEDICINE | Facility: CLINIC | Age: 44
End: 2024-07-19
Payer: COMMERCIAL

## 2024-07-19 NOTE — PROGRESS NOTES
HST POST-STUDY QUESTIONNAIRE    What time did you go to bed?  11 pm  How long do you think it took to fall asleep?  1 hour  What time did you wake up to start the day?  6:30  Did you get up during the night at all?  yes  If you woke up, do you remember approximately what time(s)? no  Did you have any difficulty with the equipment?  No  Did you us any type of treatment with this study?  None  Was the head of the bed elevated? No  Did you sleep in a recliner?  No  Did you stop using CPAP at least 3 days before this test?  NA  Any other information you'd like us to know? I'm tired

## 2024-07-22 NOTE — PROGRESS NOTES
This HSAT was performed using a Noxturnal T3 device which recorded snore, sound, movement activity, body position, nasal pressure, oronasal thermal airflow, pulse, oximetry and both chest and abdominal respiratory effort. HSAT data was restricted to the time patient states they were in bed.     HSAT was scored using 1B 4% hypopnea rule.     HST AHI (Non-PAT): 7.3  Snoring was reported as loud.  Time with SpO2 below 89% was 0.1 minutes.   Overall signal quality was good     Pt will follow up with sleep provider to determine appropriate therapy.

## 2024-07-23 NOTE — PROCEDURES
"HOME SLEEP STUDY INTERPRETATION      Patient: Kirt Burt  MRN: [id number]  YOB: 1980  Study Date: 7/18/2024  PCP/Referring Provider: No Ref-Primary, Physician;   Ordering Provider: Bennett Goltz, PA-C         Indications for Home Study: Kirt Burt is a 44 year old male who presents with symptoms suggestive of obstructive sleep apnea.    Estimated body mass index is 38.92 kg/m  as calculated from the following:    Height as of 5/17/24: 1.854 m (6' 1\").    Weight as of 5/17/24: 133.8 kg (295 lb).  Total score - [place]: 14 (7/16/2024 11:56 AM)      Data: A full night home sleep study was performed recording the standard physiologic parameters including body position, movement, sound, nasal pressure, thermal oral airflow, chest and abdominal movements with respiratory inductance plethysmography, and oxygen saturation by pulse oximetry. Pulse rate was estimated by oximetry recording. This study was considered adequate based on > 4 hours of quality oximetry and respiratory recording. As specified by the AASM Manual for the Scoring of Sleep and Associated events, version 2.3, Rule VIII.D 1B, 4% oxygen desaturation scoring for hypopneas is used as a standard of care on all home sleep apnea testing.        Analysis Time:  413.5 minutes        Respiration:   Sleep Associated Hypoxemia: sustained hypoxemia was not present. Baseline oxygen saturation was 94%.  Time with saturation less than or equal to 88% was 0.1 minutes. The lowest oxygen saturation was 87%.   Snoring: Snoring was present.  Respiratory events: The home study revealed a presence of 22 obstructive apneas and 3 mixed and central apneas. There were 25 hypopneas resulting in a combined apnea/hypopnea index [AHI] of 7.3 events per hour.  AHI was 8.2 per hour supine, N/A per hour prone, N/A per hour on left side, and 5.4 per hour on right side.   Pattern: Excluding events noted above, respiratory rate and pattern was Normal.      Position: " Percent of time spent: supine - 66.9%, prone - 0%, on left - 0%, on right - 32.4%.      Heart Rate: By pulse oximetry normal rate was noted.       Assessment:   Mild obstructive sleep apnea.  Sleep associated hypoxemia was not present.    Recommendations:  If treatment of mild obstructive sleep apnea is clinically indicated, following therapy options can be considered.  Patient can consider mandibular advancement dental appliance therapy for treatment of mild obstructive sleep apnea.  If there is excessive daytime sleepiness or other qualifying medical comorbidity, consider auto titrating CPAP therapy for treatment.  Suggest optimizing sleep hygiene and avoiding sleep deprivation.  Weight management.        Diagnosis Code(s): Obstructive Sleep Apnea G47.33    Electronically signed by: Rudolph Wisdom MD, July 22, 2024   Diplomate, American Board of Psychiatry and Neurology, Sleep Medicine

## 2024-08-08 ENCOUNTER — OFFICE VISIT (OUTPATIENT)
Dept: SLEEP MEDICINE | Facility: CLINIC | Age: 44
End: 2024-08-08
Payer: COMMERCIAL

## 2024-08-08 VITALS
SYSTOLIC BLOOD PRESSURE: 124 MMHG | WEIGHT: 293 LBS | HEART RATE: 75 BPM | HEIGHT: 73 IN | BODY MASS INDEX: 38.83 KG/M2 | DIASTOLIC BLOOD PRESSURE: 83 MMHG | OXYGEN SATURATION: 96 %

## 2024-08-08 DIAGNOSIS — G47.33 OSA (OBSTRUCTIVE SLEEP APNEA): Primary | ICD-10-CM

## 2024-08-08 DIAGNOSIS — F51.12 INSUFFICIENT SLEEP SYNDROME: ICD-10-CM

## 2024-08-08 PROCEDURE — 99215 OFFICE O/P EST HI 40 MIN: CPT | Performed by: INTERNAL MEDICINE

## 2024-08-08 NOTE — PROGRESS NOTES
[address] [address]  Phone: [phone]    Patient:  Kirt Burt, Date of birth 1980  Date of Visit:  08/08/2024  Referring Provider No ref. provider found           [facility]  Outpatient Sleep Medicine Consultation  August 8, 2024    Name: Kirt Burt MRN# [id number]   Age: 44 year old YOB: 1980     Date of Consultation: August 8, 2024  Consultation is requested by: No referring provider defined for this encounter.  Primary care provider: No Ref-Primary, Physician         Assessment and Plan:   Kirt Burt is a 44 year old male with history of hyperlipidemia, hypogonadism on testosterone supplement, generalized anxiety disorder and class II obesity has been diagnosed with mild obstructive sleep apnea with a type III home sleep test.  He is here to discuss the findings of his sleep study and ongoing sleep issues.  Sleep disordered breathing.  His history and clinical presentation is suggestive of more severe obstructive sleep apnea than objectively seen in his recent type III home sleep study.  He has nightly loud snoring, witnessed apneic episodes, frequent gas and snort arousals which is consistent with the diagnosis and suggest a higher severity.  Insufficient sleep.  On average  he is spending 7 to 7-1/2 hours in bed sleeping.  Based on his history, his usual sleep requirement and natural sleep appears to be around 9 hours.  This preference suggest sleep deprivation of up to 2 hours every day and only partial compensation over the weekend.  Circadian rhythm-delayed sleep phase time.  Historically he has been a late night sleep with his natural bedtime around midnight and he would wake up between 9 AM to 10 AM.  Throughout his adult life, his social and work-related commitments have led to an average sleep between 6 to 7 hours which has led to  chronic sleep deprivation.  Excessive daytime sleepiness.  Multifactorial, contributed by insufficient time in bed and sleeping compounded by poor quality of sleep due to untreated obstructive sleep apnea.      Comorbid Diagnoses:    Class II obesity.  Generalized anxiety disorder.    Summary Recommendations:    Start continuous positive airway pressure therapy with auto CPAP 5-15 cm of water.  He should use a full facemask as he is a habitual mouth breather.  He is advised to spend at least 8 hours in bed sleeping.  He should maintain a consistent bed and wake time.    Summary Counseling:  Next sleep clinic follow-up in 3 months.    Check out [email or web address]           History of Present Illness:     Kirt Burt is a very pleasant 44 year old male who is here with his wife.  Kirt's medical history is significant for hyperlipidemia, generalized anxiety disorder, class II obesity and past history of colon perforation.  He underwent a type III home sleep test in July 2020 for which revealed presence of mild obstructive sleep apnea with a combined apnea-hypopnea index of 7.3/h and a supine AHI of 8.2/h.    Kirt has a very busy and stressful work life.  He has always been a late night sleep who over the past several years attempted to sleep earlier with his wife.  His wife who accompanied him in the clinic today more recently has been sleeping in a separate room because of his loud snoring.  She reports of his loud snoring frequent apneic episodes.  Also wakes himself with a snort or gasp every night.  He usually goes to bed around 11 PM to 11:30 PM and will fall asleep within few minutes.  During the night he usually wakes up 2-3 times to use the bathroom but is able to fall back to sleep within few minutes.  In past he has had hypnagogue and particularly hypnopompic hallucinations but denies symptoms of sleep paralysis or cataplexy.  During the weekday he will wake up around 7:30 AM using an alarm  "white over the weekend he usually sleeps till 9 AM.  If to sleep he would prefer to go to bed around midnight and will sleep until 10 AM.  He has not done this throughout his adult life due to social and work-related commitments.      PREVIOUS SLEEP STUDIES: NOx T3 HST  Date: 07/18/2024  AHI: 7.3/h, supine AHI 8.2/h    CURRENT THERAPY-Subjective:  Sleep wake schedule:   Workday bedtime 11:30 PM  Awakening 7:30 AM  using alarm   Nonworkday bedtime 11:00 PM Awakening 9:00 AM   Awakenings 2-3 for 1-2 minutes usually to use the bathroom/week  Naps: Usually avoids    He does not feel rested in the morning.    [place] Sleepiness Scale: 14/24           Medications:     Current Outpatient Medications   Medication Sig Dispense Refill    Syringe/Needle, Disp, 22G X 1-1/2\" 1 ML MISC Inject 1 each into the muscle every 14 days 100 each 0    testosterone cypionate (DEPOTESTOSTERONE) 100 MG/ML injection Inject 0.5 mLs (50 mg) into the muscle every 14 days 10 mL 1     No current facility-administered medications for this visit.        No Known Allergies         Past Medical History:     Does not need 02 supplement at night   Past Medical History:   Diagnosis Date    Diverticulitis of colon     Generalized anxiety disorder     Intramuscular lipoma     Morbid obesity (H)              Past Surgical History:    No h/o  upper airway surgery  Past Surgical History:   Procedure Laterality Date    ARTHROSCOPY KNEE WITH MEDIAL MENISCECTOMY  5/7/2014    Procedure: ARTHROSCOPY KNEE WITH MEDIAL MENISCECTOMY;  Surgeon: Artis Dougherty MD;  Location: [facility] OR    COLONOSCOPY N/A 3/30/2021    Procedure: Colonoscopy, With Polypectomy And Biopsy;  Surgeon: Daniel Doll MD;  Location: [facility] OR    COLONOSCOPY WITH CO2 INSUFFLATION N/A 3/30/2021    Procedure: COLONOSCOPY, WITH CO2 INSUFFLATION;  Surgeon: Daniel Doll MD;  Location: [facility] OR    EXCISE MASS LOWER EXTREMITY Left 11/3/2022    Procedure: Excision left hip mass;  Surgeon: " "Surjit Cavanaugh MD;  Location: [facility] OR    NO HISTORY OF SURGERY              Physical Examination:     Objective   Vitals:  /83   Pulse 75   Ht 1.854 m (6' 1\")   Wt 132.9 kg (293 lb)   SpO2 96%   BMI 38.66 kg/m     healthy, alert, and no distress  Psych: Alert and oriented times 3; coherent speech, normal   rate and volume, able to articulate logical thoughts, able   to abstract reason, no tangential thoughts, no hallucinations   or delusions  His affect is normal    Copy to: No Ref-Primary, Physician    Total of 40 minutes of time was spent with patient, this included the interview and exam, and review of the chart/labs/imaging/sleep study/PAP therapy data on 08/08/2024. Greater than 50% of which was spent counseling and coordinating care.     Emy Galaviz MD 8/8/2024     [facility]   [address]   [address] [place] [address]. [place] [address]   Appointments: [phone]  Emy Galaviz MD         "

## 2024-08-08 NOTE — PATIENT INSTRUCTIONS
[facility] Brief CBT-I  Lesson 1: Understanding Sleep and Insomnia    Understanding Sleep and Insomnia    Most people have trouble sleeping at some point in their life.   Chronic insomnia means you have had trouble falling asleep and/or staying asleep for at least the past three months.  Despite allowing enough time for sleep, it is affecting how you feel. You are not alone.  It is estimated that 10-15% of adults experience chronic insomnia.     Cognitive-Behavioral Therapy for Insomnia    The American College [employer] Physicians  recommends Cognitive Behavioral Therapy for Insomnia (CBT-I) as the first-line treatment for insomnia.     [facility] Brief CBT-I is a five-step program that can be completed in as few as five weeks.  It can be completed with the guidance of a trained health care provider or on your own. The program will teach you the skills and strategies you will need for a better night's sleep. The first step in your program is learning a bit about sleep and insomnia.      The Basics of Sleep    How does sleep help us?    Sleep, like food and water, is something we need every day. The purpose of sleep is still not exactly clear, but sleep experts agree we need consistent quality sleep to function at our best. There is evidence that sleep helps maintain brain and body functions.  It helps maintain thinking ability and mood.  Sleep is actually a very active part of life. Sleep occurs in four stages that cycle every  minutes throughout the night. We get our deepest sleep during the first few hours of sleep.  During the last half of our sleep, we usually get the bulk of our REM (Rapid Eye Movement) sleep.  REM sleep is when most of our dreaming occurs.    How much sleep do we need?    Sleep needs vary from person to person. Most adults need at least 7 hours of sleep though some may need less and others more.  Sleeping too little or too much may be a health risk.  As we age, most people  report their sleep gets lighter, earlier, shorter, and more restless.     What Controls Sleep?    The three things that regulate your sleep are your:     Sleep Drive  Biological Clock  Arousal System (physical and emotional states)    Together these make us feel alert during the day and promote sleep at night.    Your sleep drive depends on how long you have been awake. It is lowest when you first wake up.  Sleep drive gradually increases as the day goes on.  The longer time you are awake the easier it is to fall asleep.  Sleeping gradually reduces your sleep drive. That is why napping in the evening or close to bed can make it harder to sleep at night.    Your biological clock promotes wakefulness during the day and sleep at night.    Adapted from Jonnie et al. (2009). Evaluation and Management of Insomnia in the Psychiatric setting. Published Online: 19/1/2009; DOI: https://doi.org/10.1176/foc.7.4.dke983GR      Understanding Insomnia    What is insomnia?    Insomnia occurs when you:    Have difficulty with...  Falling Asleep  Staying Asleep  Short amount of sleep    Have adequate time for sleep  Experience daytime problems as a result of your sleep difficulties    What causes insomnia?    Some people have a greater chance of developing insomnia due to biological, psychological or social factors. These are often referred to as predisposing factors.  A host of things can trigger insomnia such as a stressful event, jet lag, working a different shift, medication, or the onset of a medical or mental health condition. There are called precipitating factors.        What maintains insomnia?    Short-term insomnia can become chronic because of habits or conditions that perpetuate it including:    Unhelpful sleep habits such as spending more time in bed, sleeping in on weekends to try to catch up on sleep  Stress, worry or depression  Worry or fear about your insomnia  Pain or other medical conditions  Some  "medications  Untreated sleep disorders    As you spend more time in bed or try forcing yourself to sleep your bed becomes linked with wakefulness.  This type of  conditioning  along with unhelpful sleep habits maintains the insomnia even when the triggering event has resolved.    Sleep and Your Arousal System    Mental activity, emotions and physical symptoms can make your brain too active to sleep by masking the strength of your sleep drive. Your brain's arousal system not only can triggers insomnia.  It also plays a role in maintaining it as well.  Common sources of arousal include:    Worry about sleep  An active mind concerned about unfinished tasks  Anxiety, stress and depression  Pain    Common Treatments for Insomnia    Behavioral:  Changing your behavior and habits to change your sleep  Sleeping Pills (Prescription and OTC)  Antidepressants   \"Alternative\" remedies (Melatonin, Ashwagandha, Chamomile, Valerian, 5-HTP etc.)    How Brief CBT-I Works     Brief CBT-I targets negative behavioral conditioning and habits that hurt your sleep and lead to long-term insomnia     Behavioral Conditioning    When you lay awake in bed over many nights, your body actually becomes trained or 'conditioned' to be awake during the night.  It makes your bed trigger alertness instead of sleepiness.  Brief CBT-I helps strengthen the behavioral association between sleep and your bed.    Changing Habits that HURT sleep:    Using your bed for things other than sleep and intimacy  Shifting sleep schedules from night to night  Extending time in bed  Worrying  Worrying about sleep or trying too hard to sleep  Lack of a \"wind down\" time before bed  Long or late naps  Uncomfortable sleep environment  Alcohol  Caffeine    Developing habits that HELP your sleep:    Keeping the bed for sleep and intimacy  Maintaining a consistent sleep schedule  Daily routines including time to wind down and manage worry  Managing thoughts and expectations " about sleep      Is Brief CBT-I right for you?    CBT-I has been shown to be effective in treating insomnia across a variety of age group and with individuals who have other health conditions.    You may be a good candidate for Brief CBT-I if:    You believe CBT-I can help you sleep better.  You are motivated to follow a five-week behavioral program.   You are able (or have assistance) to complete a daily sleep diaryM  Your Medical and/or mental health conditions are stable and treated.  You do not have untreated Sleep Apnea or Restless Leg syndrome.    There are several health conditions where Brief CBT-I may not be indicated:    Bipolar Disorder  Seizure Disorder  Shift-work Sleep Disorder  Sleep Walking  Night Terrors  Excessive Daytime Sleepiness    Tracking your Sleep    Sleep tracking is an essential part of the process of behavioral sleep training and monitoring your progress.  There are several ways to keep track of your sleep habits. A paper or digital sleep diary is preferred but wearable devices are acceptable.     The data you enter should be your recollection of the past nigh of sleep. Do not watch or monitor the clock in the middle of the night while keeping your sleep diary. It will be important to gather a week of sleep diary information for the next step of this program.     [facility] Sleep Diary    You can also track your sleep using the [facility] paper sleep diary.  You can upload your sleep diary and send it via a [employer] message  or have it with you at the time of your next visit.        CBT-I  Ana    The CBTI  ana is a convenient way to keep track of your sleep prior to and during treatment.  Simply download the free ana on your Apple or Android phone and record your information each morning in the Sleep Diary section.     The ana also includes training and sleep schedule recommendations.  We recommend you use only the tracking function unless instructed by your health  care provider.     You can email your data to yourself prior to your visit by using the [facility] User Data function found in the Settings Section.  These data will be essential for the next step in your program.            Mobile and Wearable Sleep Tracking Devices    There are many wearable sleep tracking devices and mobile applications that estimate the time,amount and quality of sleep.  They do so largely by recording and analyzing your body movement. These devices often claim to measure the stages of sleep. However, unlike a laboratory sleep study, they do not measure brain wave activity or other key indicators required to determine  stages of sleep or identify sleep disorders. They do not provide accurate information about the time it takes to fall asleep or the amount of time you are awake in the middle of the night.    Wearable devices are helpful in estimating the time and amount of sleep at night.  Thus, they are acceptable to use instead of a paper or digital sleep diary.  If you choose to use a wearable device to track your sleep, this information will need to be available for the next step of the program.    CBT-I and Sleeping Pills    Sleep medications can be helpful in the short-term but often stop working in the long-term.  Sleeping pills treat symptoms and not the underlying cause of insomnia.  They also can have side effects that may last well into the day. Abruptly stopping sleeping pills can cause temporary rebound insomnia and lead to increased distress about sleeplessness.  This in turn strengthens the belief that pills are necessary for sleep.    Many patients choose to discontinue sleeping pills prior to beginning CBT-I.  You should talk to your prescribing provider before tapering or discontinuing sleep medication.               MY TREATMENT INFORMATION FOR SLEEP APNEA-  Kirt Burt    DOCTOR : Emy Galaviz MD    Am I having a sleep study at a sleep center?  --->Due to normal delays,  "you will be contacted within 2-4 weeks to schedule    Am I having a home sleep study?  --->Watch the video for the device you are using:    -/drop off device-   https://www.LiquidFrameworksube.com/watch?v=yGGFBdELGhk    -Disposable device sent out require phone/computer application-   https://www.LiquidFrameworksube.com/watch?v=BCce_vbiwxE      Frequently asked questions:  1. What is Obstructive Sleep Apnea (MARCO)? MARCO is the most common type of sleep apnea. Apnea means, \"without breath.\"  Apnea is most often caused by narrowing or collapse of the upper airway as muscles relax during sleep.   Almost everyone has occasional apneas. Most people with sleep apnea have had brief interruptions at night frequently for many years.  The severity of sleep apnea is related to how frequent and severe the events are.   2. What are the consequences of MARCO? Symptoms include: feeling sleepy during the day, snoring loudly, gasping or stopping of breathing, trouble sleeping, and occasionally morning headaches or heartburn at night.  Sleepiness can be serious and even increase the risk of falling asleep while driving. Other health consequences may include development of high blood pressure and other cardiovascular disease in persons who are susceptible. Untreated MARCO  can contribute to heart disease, stroke and diabetes.   3. What are the treatment options? In most situations, sleep apnea is a lifelong disease that must be managed with daily therapy. Medications are not effective for sleep apnea and surgery is generally not considered until other therapies have been tried. Your treatment is your choice . Continuous Positive Airway (CPAP) works right away and is the therapy that is effective in nearly everyone. An oral device to hold your jaw forward is usually the next most reliable option. Other options include postioning devices (to keep you off your back), weight loss, and surgery including a tongue pacing device. There is more detail about some of " these options below.  4. Are my sleep studies covered by insurance? Although we will request verification of coverage, we advise you also check in advance of the study to ensure there is coverage.    Important tips for those choosing CPAP and similar devices  REMEMBER-IF YOU RECEIVE A CALL FROM  [phone]-->IT IS TO SETUP A DEVICE  For new devices, sign up for device RIAZ to monitor your device for your followup visits  We encourage you to utilize the [employer] riaz or website ( https://Kivivi/ ) to monitor your therapy progress and share the data with your healthcare team when you discuss your sleep apnea.                                                    Know your equipment:  CPAP is continuous positive airway pressure that prevents obstructive sleep apnea by keeping the throat from collapsing while you are sleeping. In most cases, the device is  smart  and can slowly self-adjusts if your throat collapses and keeps a record every day of how well you are treated-this information is available to you and your care team.  BPAP is bilevel positive airway pressure that keeps your throat open and also assists each breath with a pressure boost to maintain adequate breathing.  Special kinds of BPAP are used in patients who have inadequate breathing from lung or heart disease. In most cases, the device is  smart  and can slowly self-adjusts to assist breathing. Like CPAP, the device keeps a record of how well you are treated.  Your mask is your connection to the device. You get to choose what feels most comfortable and the staff will help to make sure if fits. Here: are some examples of the different masks that are available: Magnetic mask aids may assist with use but there are safety issues that should be addressed when considering with magnets* ( see end of discussion).       Key points to remember on your journey with sleep apnea:  Sleep study.  PAP devices often need to be adjusted during a sleep study  to show that they are effective and adjusted right.  Good tips to remember: Try wearing just the mask during a quiet time during the day so your body adapts to wearing it. A humidifier is recommended for comfort in most cases to prevent drying of your nose and throat. Allergy medication from your provider may help you if you are having nasal congestion.  Getting settled-in. It takes more than one night for most of us to get used to wearing a mask. Try wearing just the mask during a quiet time during the day so your body adapts to wearing it. A humidifier is recommended for comfort in most cases. Our team will work with you carefully on the first day and will be in contact within 4 days and again at 2 and 4 weeks for advice and remote device adjustments. Your therapy is evaluated by the device each day.   Use it every night. The more you are able to sleep naturally for 7-8 hours, the more likely you will have good sleep and to prevent health risks or symptoms from sleep apnea. Even if you use it 4 hours it helps. Occasionally all of us are unable to use a medical therapy, in sleep apnea, it is not dangerous to miss one night.   Communicate. Call our skilled team on the number provided on the first day if your visit for problems that make it difficult to wear the device. Over 2 out of 3 patients can learn to wear the device long-term with help from our team. Remember to call our team or your sleep providers if you are unable to wear the device as we may have other solutions for those who cannot adapt to mask CPAP therapy. It is recommended that you sleep your sleep provider within the first 3 months and yearly after that if you are not having problems.   Use it for your health. We encourage use of CPAP masks during daytime quiet periods to allow your face and brain to adapt to the sensation of CPAP so that it will be a more natural sensation to awaken to at night or during naps. This can be very useful during the  first few weeks or months of adapting to CPAP though it does not help medically to wear CPAP during wakefulness and  should not be used as a strategy just to meet guidelines.  Take care of your equipment. Make sure you clean your mask and tubing using directions every day and that your filter and mask are replaced as recommended or if they are not working.     *Masks with magnets:  Updated Contraindications  Masks with magnetic components are contraindicated for use by patients where they, or anyone in close physical contact while using the mask, have the following:   Active medical implants that interact with magnets (i.e., pacemakers, implantable cardioverter defibrillators (ICD), neurostimulators, cerebrospinal fluid (CSF) shunts, insulin/infusion pumps)   Metallic implants/objects containing ferromagnetic material (i.e., aneurysm clips/flow disruption devices, embolic coils, stents, valves, electrodes, implants to restore hearing or balance with implanted magnets, ocular implants, metallic splinters in the eye)  Updated Warning  Keep the mask magnets at a safe distance of at least 6 inches (150 mm) away from implants or medical devices that may be adversely affected by magnetic interference. This warning applies to you or anyone in close physical contact with your mask. The magnets are in the frame and lower headgear clips, with a magnetic field strength of up to 400mT. When worn, they connect to secure the mask but may inadvertently detach while asleep.  Implants/medical devices, including those listed within contraindications, may be adversely affected if they change function under external magnetic fields or contain ferromagnetic materials that attract/repel to magnetic fields (some metallic implants, e.g., contact lenses with metal, dental implants, metallic cranial plates, screws, nlea hole covers, and bone substitute devices). Consult your physician and  of your implant / other medical device  for information on the potential adverse effects of magnetic fields.    BESIDES CPAP, WHAT OTHER THERAPIES ARE THERE?    Positioning Device  Positioning devices are generally used when sleep apnea is mild and only occurs on your back.This example shows a pillow that straps around the waist. It may be appropriate for those whose sleep study shows milder sleep apnea that occurs primarily when lying flat on one's back. Preliminary studies have shown benefit but effectiveness at home may need to be verified by a home sleep test. These devices are generally not covered by medical insurance.  Examples of devices that maintain sleeping on the back to prevent snoring and mild sleep apnea.    Belt type body positioner  [email or web address]    Electronic reminder  http://nightshifttherapy.[email or web address]            Oral Appliance  What is oral appliance therapy?  An oral appliance device fits on your teeth at night like a retainer used after having braces. The device is made by a specialized dentist and requires several visits over 1-2 months before a manufactured device is made to fit your teeth and is adjusted to prevent your sleep apnea. Once an oral device is working properly, snoring should be improved. A home sleep test may be recommended at that time if to determine whether the sleep apnea is adequately treated.       Some things to remember:  -Oral devices are often, but not always, covered by your medical insurance. Be sure to check with your insurance provider.   -If you are referred for oral therapy, you will be given a list of specialized dentists to consider or you may choose to visit the Web site of the American Academy of Dental Sleep Medicine  -Oral devices are less likely to work if you have severe sleep apnea or are extremely overweight.     More detailed information  An oral appliance is a small acrylic device that fits over the upper and lower teeth  (similar to a retainer or a mouth guard). This device slightly  moves jaw forward, which moves the base of the tongue forward, opens the airway, improves breathing for effective treat snoring and obstructive sleep apnea in perhaps 7 out of 10 people .  The best working devices are custom-made by a dental device  after a mold is made of the teeth 1, 2, 3.  When is an oral appliance indicated?  Oral appliance therapy is recommended as a first-line treatment for patients with primary snoring, mild sleep apnea, and for patients with moderate sleep apnea who prefer appliance therapy to use of CPAP4, 5. Severity of sleep apnea is determined by sleep testing and is based on the number of respiratory events per hour of sleep.   How successful is oral appliance therapy?  The success rate of oral appliance therapy in patients with mild sleep apnea is 75-80% while in patients with moderate sleep apnea it is 50-70%. The chance of success in patients with severe sleep apnea is 40-50%. The research also shows that oral appliances have a beneficial effect on the cardiovascular health of MARCO patients at the same magnitude as CPAP therapy7.  Oral appliances should be a second-line treatment in cases of severe sleep apnea, but if not completely successful then a combination therapy utilizing CPAP plus oral appliance therapy may be effective. Oral appliances tend to be effective in a broad range of patients although studies show that the patients who have the highest success are females, younger patients, those with milder disease, and less severe obesity. 3, 6.   Finding a dentist that practices dental sleep medicine  Specific training is available through the American Academy of Dental Sleep Medicine for dentists interested in working in the field of sleep. To find a dentist who is educated in the field of sleep and the use of oral appliances, near you, visit the Web site of the American Academy of Dental Sleep Medicine.    References  1. isabelle Vora al. Objectively measured vs  self-reported compliance during oral appliance therapy for sleep-disordered breathing. Chest 2013; 144(5): 7289-7073.  2. Diamond, et al. Objective measurement of compliance during oral appliance therapy for sleep-disordered breathing. Thorax 2013; 68(1): 91-96.  3. Kalyani et al. Mandibular advancement devices in 620 men and women with MARCO and snoring: tolerability and predictors of treatment success. Chest 2004; 125: 6420-9361.  4. Gage et al. Oral appliances for snoring and MARCO: a review. Sleep 2006; 29: 244-262.  5. Nery et al. Oral appliance treatment for MARCO: an update. J Clin Sleep Med 2014; 10(2): 215-227.  6. Tio et al. Predictors of OSAH treatment outcome. J Dent Res 2007; 86: 9346-8986.      Weight Loss:   Your Body mass index is 38.66 kg/m .    Being overweight does not necessarily mean you will have health consequences.  Those who have BMI over 35 or over 27 with existing medical conditions carries greater risk.   Weight loss decreases severity of sleep apnea in most people with obesity. For those with mild obesity who have developed snoring with weight gain, even 15-30 pound weight loss can improve and occasionally milder eliminate sleep apnea.  Structured and life-long dietary and health habits are necessary to lose weight and keep healthier weight levels.     The Comprehensive Weight loss program offers all aspects of weight loss strategies including two Non-Surgical Weight Loss Programs: Medical Weight Management and our 24 Week Healthy Lifestyle Program:    Medical Weight Management: You will meet with a Medical Weight Management Provider, as well as a Registered Dietician. The program may include medication therapy, dietary education, recommended exercise and physical therapy programs, monthly support group meetings, and possible psychological counseling. Follow up visits with the provider or dietician are scheduled based on your progress and needs.    24 Week Healthy  Lifestyle Program: This unique program is designed to give you the support of weekly appointments and activities thru a 24-week period. It may include all of the components of the basic program (above), with the addition of 11 individual Health  Visits, 24-week access to the [employer] website for over 700 online classes, and monthly support group meetings. This program has an out-of-pocket expense of $499 to cover the items that can not be billed to insurance (health coaches and [employer] access), and is non-refundable/non-transferable (you may be able to use a Health Savings Account; ask your HSA provider). There may be an optional meal replacement plan prescribed as well.   Surgical management achieves meaningful long-term weight loss and improvement in health risks in most patients with more severe obesity.      Sleep Apnea Surgery:    Surgery for obstructive sleep apnea is considered generally only when other therapies fail to work. Surgery may be discussed with you if you are having a difficult time tolerating CPAP and or when there is an abnormal structure that requires surgical correction.  Nose and throat surgeries often enlarge the airway to prevent collapse.  Most of these surgeries create pain for 1-2 weeks and up to half of the most common surgeries are not effective throughout life.  You should carefully discuss the benefits and drawbacks to surgery with your sleep provider and surgeon to determine if it is the best solution for you.   More information  Surgery for MARCO is directed at areas that are responsible for narrowing or complete obstruction of the airway during sleep.  There are a wide range of procedures available to enlarge and/or stabilize the airway to prevent blockage of breathing in the three major areas where it can occur: the palate, tongue, and nasal regions.  Successful surgical treatment depends on the accurate identification of the factors responsible for obstructive sleep apnea  in each person.  A personalized approach is required because there is no single treatment that works well for everyone.  Because of anatomic variation, consultation with an examination by a sleep surgeon is a critical first step in determining what surgical options are best for each patient.  In some cases, examination during sedation may be recommended in order to guide the selection of procedures.  Patients will be counseled about risks and benefits as well as the typical recovery course after surgery. Surgery is typically not a cure for a person s MARCO.  However, surgery will often significantly improve one s MARCO severity (termed  success rate ).  Even in the absence of a cure, surgery will decrease the cardiovascular risk associated with OSA7; improve overall quality of life8 (sleepiness, functionality, sleep quality, etc).      Palate Procedures:  Patients with MARCO often have narrowing of their airway in the region of their tonsils and uvula.  The goals of palate procedures are to widen the airway in this region as well as to help the tissues resist collapse.  Modern palate procedure techniques focus on tissue conservation and soft tissue rearrangement, rather than tissue removal.  Often the uvula is preserved in this procedure. Residual sleep apnea is common in patient after pharyngoplasty with an average reduction in sleep apnea events of 33%2.      Tongue Procedures:  ExamWhile patients are awake, the muscles that surround the throat are active and keep this region open for breathing. These muscles relax during sleep, allowing the tongue and other structures to collapse and block breathing.  There are several different tongue procedures available.  Selection of a tongue base procedure depends on characteristics seen on physical exam.  Generally, procedures are aimed at removing bulky tissues in this area or preventing the back of the tongue from falling back during sleep.  Success rates for tongue surgery  range from 50-62%3.    Hypoglossal Nerve Stimulation:  Hypoglossal nerve stimulation has recently received approval from the [place] Food and Drug Administration for the treatment of obstructive sleep apnea.  This is based on research showing that the system was safe and effective in treating sleep apnea6.  Results showed that the median AHI score decreased 68%, from 29.3 to 9.0. This therapy uses an implant system that senses breathing patterns and delivers mild stimulation to airway muscles, which keeps the airway open during sleep.  The system consists of three fully implanted components: a small generator (similar in size to a pacemaker), a breathing sensor, and a stimulation lead.  Using a small handheld remote, a patient turns the therapy on before bed and off upon awakening.    Candidates for this device must be greater than 18 years of age, have moderate to severe obstructive sleep apnea with less than 25% central events  (AHI between 15-65), BMI less than 35, have tried CPAP/oral appliance for at least 8 weeks without success, and have appropriate upper airway anatomy (determined by a sleep endoscopy performed by Dr. Doug Morrison or Dr. Bassam Lester).    Nasal Procedures:  Nasal obstruction can interfere with nasal breathing during the day and night.  Studies have shown that relief of nasal obstruction can improve the ability of some patients to tolerate positive airway pressure therapy for obstructive sleep apnea1.  Treatment options include medications such as nasal saline, topical corticosteroid and antihistamine sprays, and oral medications such as antihistamines or decongestants. Non-surgical treatments can include external nasal dilators for selected patients. If these are not successful by themselves, surgery can improve the nasal airway either alone or in combination with these other options.        Combination Procedures:  Combination of surgical procedures and other treatments may be  recommended, particularly if patients have more than one area of narrowing or persistent positional disease.  The success rate of combination surgery ranges from 66-80%2,3.    References  Bipin VAZ. The Role of the Nose in Snoring and Obstructive Sleep Apnoea: An Update.  Eur Arch Otorhinolaryngol. 2011; 268: 1365-73.   Flor SM; Tammie JA; Yenny JR; Pallanch JF; Lynne MB; Geeta SG; Benjamin CHAMBERS. Surgical modifications of the upper airway for obstructive sleep apnea in adults: a systematic review and meta-analysis. SLEEP 2010;33(10):5282-0897. Garcia MORALES. Hypopharyngeal surgery in obstructive sleep apnea: an evidence-based medicine review.  Arch Otolaryngol Head Neck Surg. 2006 Feb;132(2):206-13.  Clayton YH1, Kenny Y, Zen REN. The efficacy of anatomically based multilevel surgery for obstructive sleep apnea. Otolaryngol Head Neck Surg. 2003 Oct;129(4):327-35.  Kezirian E, Goldberg A. Hypopharyngeal Surgery in Obstructive Sleep Apnea: An Evidence-Based Medicine Review. Arch Otolaryngol Head Neck Surg. 2006 Feb;132(2):206-13.  Lazaro PJ et al. Upper-Airway Stimulation for Obstructive Sleep Apnea.  N Engl J Med. 2014 Jan 9;370(2):139-49.  Nisha Y et al. Increased Incidence of Cardiovascular Disease in Middle-aged Men with Obstructive Sleep Apnea. Am J Respir Crit Care Med; 2002 166: 159-165  Benítez EM et al. Studying Life Effects and Effectiveness of Palatopharyngoplasty (SLEEP) study: Subjective Outcomes of Isolated Uvulopalatopharyngoplasty. Otolaryngol Head Neck Surg. 2011; 144: 623-631.        WHAT IF I ONLY HAVE SNORING?    Mandibular advancement devices, lateral sleep positioning, long-term weight loss and treatment of nasal allergies have been shown to improve snoring.  Exercising tongue muscles with a game (https://apps.[employer]/us/riaz/soundly-reduce-snoring/ls1541696064) or stimulating the tongue during the day with a device (https://doi.org/10.3390/njh67287155) have improved snoring in some  "individuals.  https://www.[employer].StormPins/  https://www.sleepfoundation.org/best-anti-snoring-mouthpieces-and-mouthguards    Remember to Drive Safe... Drive Alive     Sleep health profoundly affects your health, mood, and your safety.  Thirty three percent of the population (one in three of us) is not getting enough sleep and many have a sleep disorder. Not getting enough sleep or having an untreated / undertreated sleep condition may make us sleepy without even knowing it. In fact, our driving could be dramatically impaired due to our sleep health. As your provider, here are some things I would like you to know about driving:     Here are some warning signs for impairment and dangerous drowsy driving:              -Having been awake more than 16 hours               -Looking tired               -Eyelid drooping              -Head nodding (it could be too late at this point)              -Driving for more than 30 minutes     Some things you could do to make the driving safer if you are experiencing some drowsiness:              -Stop driving and rest              -Call for transportation              -Make sure your sleep disorder is adequately treated     Some things that have been shown NOT to work when experiencing drowsiness while driving:              -Turning on the radio              -Opening windows              -Eating any  distracting  /  entertaining  foods (e.g., sunflower seeds, candy, or any other)              -Talking on the phone      Your decision may not only impact your life, but also the life of others. Please, remember to drive safe for yourself and all of us.    DELAYED SLEEP PHASE  What is it?   Delayed sleep phase disorder (DSP) is a circadian rhythm disorder. It consists of a typical sleep pattern that is \"delayed\" by two or more hours. This delay occurs when one s internal sleep clock (circadian rhythm) is shifted later at night and later in the morning. Once sleep occurs, the sleep is " "generally normal. But the delay leads to a pattern of sleep that is later than what is desired or what is considered socially acceptable. This pattern can be a problem when it interferes with work or social demands.  A person with DSP is likely to prefer late bedtimes and late wake-up times. When left to his or her own schedule, a person with DSP is likely to have a normal amount and quality of sleep. It simply occurs at a delayed time. One sign of this disorder is difficulty falling asleep until late at night. Another sign is having a hard time getting out of bed in the morning for work or school. These signs can make DSP look like insomnia. Daytime functioning can be severely impaired by DSP. It can lead to excessive sleepiness and fatigue. When able to sleep on their own schedules, people with DSP often stay up until they get tired and then sleep until they awaken late in the morning. In this case, they tend to have no complaint of difficulty falling to sleep or feeling poorly during the day.         Who gets it?   The exact rate of DSP is unknown in the general population. It is much more common in teens and young adults. From 7% to 16% of them may have it. DSP is likely to be found in 10% of people with a complaint of chronic insomnia. People who tend to be \"evening types\" or \"night owls\" are likely to develop DSP.     There is likely to be some genetic component. Some environmental factors may also be involved. A lack of exposure to morning sunlight may make it worse. Too much exposure to bright evening sunlight may also increase symptoms of DSP. A family history of DSP is common in about 40% of people with the disorder.     How do I know if I have it?   Is there a delay in your sleep pattern in relation to your desired sleep and wake times? Do you have trouble falling asleep at the desired time of night? Are you then unable to awaken at the desired or socially acceptable time?   When left to your own sleep " schedule, do you have a normal duration and quality of sleep? Does this sleep occur in a stable, but delayed time period in relation to what is desired or socially acceptable?   Have you had this kind of stable but delayed sleep time for at least seven days?   If you answered  yes  to these questions, then you may have delayed sleep phase disorder.  It is also important to know if there is something else that is causing your sleep problems. They may be a result of one of the following:  Another sleep disorder   A medical condition   Medication use   A mental health disorder   Substance abuse            Do I need to see a sleep specialist?       LIGHT THERAPY  What is it?  Light therapy is a treatment used for people who suffer from circadian rhythm sleep disorders. Your body has an internal clock that tells it when it is time to be asleep and when it is time to be awake.     This clock is located in the brain just above an area where the nerves travel to the eyes. This area is called the SCN. Your clock controls the  circadian rhythms  in your body. These rhythms include body temperature, alertness and the daily cycle of many hormones.     The word  circadian  means to occur in a cycle of about 24 hours. Circadian rhythms make you feel sleepy or alert at regular times every day. Some people have a circadian rhythm sleep disorder. This causes their natural sleep time to overlap with regular awake activities such as work or school.   Among other factors, your clock is  set  by your exposure to bright light such as sunlight. Exposure to bright light or  light therapy  is one method used to treat people with a circadian rhythm sleep disorder.     The goal for treating patients who have circadian rhythm problems is to combine a healthy sleep pattern with an internal clock that is set at the right time. This will allow them to enjoy the benefits of good sleep.     Light therapy can help someone  re-set  a clock that is  off. Regular sleep patterns help to keep the clock set at the new time. Light therapy is only part of a treatment plan that should be guided by a doctor who is familiar with sleep disorders.   Light therapy is used to expose your eyes to intense but safe amounts of light for a specific and regular length of time. In many places, sunlight is not available at the proper time to be used as treatment.     Artificial light may be used to affect the body clock in the same way that sunlight does. New advances continue to be made in this field. Currently, products that are used for light therapy fit into four basic groups:   1. Light Box   This is the most common tool that is used in light therapy. The box houses several tubes that produce extremely bright light. It sits on top of a table or desk and plugs into the wall.     During a treatment session, you have to keep within a certain distance of the box. Usually, you will be about 18 to 24 inches away from it. It does not require you to look directly into the light. Instead, you simply face in the direction of the box.     You are able to do other activities during the session. Ideally, you would work on papers or read something that is in the area being lit up. This will allow the light to be received by your eyes. Your body takes in this information and uses it to regulate the rhythms that control when you sleep and when you wake.   Earlier models of light boxes put out 2,500 to 5,000 lux of light. Lux is a measure of how much light falls on your eyes. These sessions could take two or three hours. Now, many boxes produce 10,000 lux of light. This allows sessions to take as little as 15 to 30 minutes.     More than one session may be needed each day. It depends upon your body, your need, and the strength of light being used. The key is to use the light at the right time of day and for the right amount of time. This is based upon the sleep disorder you want to correct.      New models are also safer, protecting you from harmful UV rays. Some models are now focusing on a specific bandwidth of light. Light boxes can be purchased in a variety of makes and models. Some are now being made much smaller so they are easier to take with you. General prices range from $200 - $500 per light box.   2. Desk Lamp   This serves the same purpose as a light box, but it is made to look like a normal lamp. It blends in better when used in an office setting.   3. Light Visor   This is a light source that is worn on your head and hangs over your eyes. It looks much like a tennis visor. It is made so that you can move around during sessions. The strength of visor lights also varies from 3,000 to 10,000 lux.   4. Yoana Simulator     These lights gradually make a dark room brighter over a set period of time. This is meant to mimic the sunrise. Some people may find that this helps them wake up in the morning. Models may also slowly dim to copy a sunset.          Who gets it?  Bright light therapy is used for people who suffer from circadian rhythm disorders. The time of day when the light is used will depend upon the disorder it is meant to correct. These disorders include the followin. Delayed sleep phase disorder   This causes people to fall asleep much later at night than is normal. As a result, they also wake up later in the morning. This sleep pattern can interfere with their schedule of activities for the day. To correct delayed sleep phase, light treatment takes place during the early morning hours.   2. Advanced sleep phase disorder   This causes people to fall asleep much earlier at night than is normal. They also wake up earlier in the morning. To correct it, light treatment takes place early at night.   3. Free-running or Non-24-hour sleep-wake rhythm   People with this disorder fall asleep at a different time each day. For example, you may fall asleep at 10 p.m. one day, Midnight the  next day, 2 a.m. the next, etc. This most often occurs in people who are blind. Light therapy may help blind people, even if they can't perceive visible light. Studies show that light treatment may be useful in the early morning hours.   4. Jet lag   Jet lag causes people to have problems with sleep when they have crossed many time zones on a flight. Light therapy in the morning may help when traveling east. For travel to the west, bright light in the evening may help reduce jet lag.   5. Shift Work   This sleep disorder occurs due to a work schedule, such as night shift, that takes place during the time when most people are sleeping. This schedule requires you to work when your body wants to sleep. Then you have to try to sleep when your body expects to be awake. Correcting it can be a hard problem to solve. Changing work schedules, days off, and social activities can alter your exposure to light from day to day. Frequent changes in your sleep times make it hard to re-set your internal clock. In general, using light treatment in the evening should help someone who regularly works nights. In this case, you would also want to avoid daylight when you come off work and go to bed. Dark sunglasses or special goggles can help.   6. Seasonal affective disorder (SAD)   SAD is a mood disorder that can cause people to feel sad and lack energy during the dark months of winter. A similar and milder version is often called the  Winter Blues.  In severe forms, sadness may be caused by depression. Light therapy is thought to be useful as one of the treatments for seasonal mood disorders and depression. Depression is also treated with medications. You should consult your doctor if you are having serious problems with sadness.         Possible side effects?  Light therapy has a good record of safety. It does not seem to produce any major side effects. Light therapy should always be used within the proper limits for intensity and  time. Minor side effects may include the following:   Eye irritation and dryness   Headache   Nausea   Dryness of skin   To reduce these side effects, begin the light therapy very slowly. Give your body time to get used to it. The use of a humidifier can also help with irritations caused by dryness. Talk to your doctor or a sleep specialist before beginning use.       Yes. It is easy to confuse DSP with normal variations of sleep and other types of insomnia. Consulting with a sleep specialist is your best bet to help clarify current sleep problems. He or she will also be able to help you develop a plan to correct these problems. A specialist can assess the factors that cause and make this problem worse. These include both social and behavioral factors.     What will the doctor need to know?   The sleep doctor will do a thorough physical exam. He or she will also discuss with you the history of these sleep problems. It would be helpful to keep a sleep diary prior to seeing a sleep doctor. Bring this information with you to the appointment. A sleep diary is a systematic way to track your sleep pattern. You record the time you get into bed, the time required to fall asleep, and the time you wake up in the morning. Sleep diaries often show a regular pattern of difficulty falling to sleep. They often show few or no awakenings once asleep. They also tend to show a sleep duration that is reduced during the work week and lengthy on the weekend.     Will I need to take any tests?   An overnight sleep study is not normally needed for someone who suffers from DSP. Your doctor may have you do an overnight sleep study if your problem is severely disturbing your sleep. This study is called a polysomnogram. It charts your brain waves, heart rate, and breathing as you sleep. It also records how your arms and legs move. This study will help determine if there are any objective sleep disorders related to your sleep problem.     How  is it treated?   The most accepted treatment for DSP is what is called chronotherapy. This is a method of behavioral treatment. Your bedtime is delayed by about three hours per day for five or six consecutive days. Once the desired bedtime is reached the schedule is frozen. This schedule needs to be maintained rigidly at this point.  Bright light therapy is another proven technique for changing one s internal circadian rhythms. But its specific use for DSP has not been well validated. In theory, exposure to bright light should occur shortly after waking up at the desired time in the morning. Then bright outdoor light in the evening hours should be avoided.

## 2024-08-13 ENCOUNTER — DOCUMENTATION ONLY (OUTPATIENT)
Dept: SLEEP MEDICINE | Facility: CLINIC | Age: 44
End: 2024-08-13
Payer: COMMERCIAL

## 2024-08-13 DIAGNOSIS — G47.33 OSA (OBSTRUCTIVE SLEEP APNEA): ICD-10-CM

## 2024-08-13 DIAGNOSIS — G47.01 INSOMNIA DUE TO MEDICAL CONDITION: Primary | ICD-10-CM

## 2024-08-13 NOTE — PROGRESS NOTES
Patient was offered choice of vendor and chose [facility].  Patient Kirt Burt was set up at [place]  on August 13, 2024. Patient received a Resmed Airsense 11 Pressures were set at  5-15 cm H2O.   Patient s ramp is 5 cm H2O for 20 min and FLEX/EPR is EPR, 2.  Patient received a Resmed Mask name: AIRFIT F30I  Full Face mask size Large, Wide, heated tubing and heated humidifier.  Patient has the following compliance requirements: none  Patient has a follow up on 11/12/2024 with sleep fellow .    Kellie Keys

## 2024-08-16 ENCOUNTER — DOCUMENTATION ONLY (OUTPATIENT)
Dept: SLEEP MEDICINE | Facility: CLINIC | Age: 44
End: 2024-08-16
Payer: COMMERCIAL

## 2024-08-16 NOTE — PROGRESS NOTES
3 day Sleep therapy management telephone visit    Diagnostic AHI:    HST: 7.3        LEFT VOICE MESSAGE FOR PATIENT TO RETURN CALL      Order settings:  CPAP MIN CPAP MAX   5 cm H2O 15 cm H2O         Device settings:  CPAP MIN CPAP MAX EPR RESMED SOFT RESPONSE SETTING   5 cm  H20 15 cm  H20 2 OFF         Patient has the following upcoming sleep appts:  Future Sleep Appointments         Provider Department    11/12/2024 2:00 PM (Arrive by 1:45 PM) Guillaume Quevedo MD [facility]            Replacement device: No  STM ordered by provider: Yes     Total time spent on accessing and  interpreting remote patient PAP therapy data  10 minutes    Total time spent counseling, coaching  and reviewing PAP therapy data with patient  0 minutes

## 2024-08-28 ENCOUNTER — DOCUMENTATION ONLY (OUTPATIENT)
Dept: SLEEP MEDICINE | Facility: CLINIC | Age: 44
End: 2024-08-28
Payer: COMMERCIAL

## 2024-08-28 NOTE — PROGRESS NOTES
14 day Sleep therapy management telephone visit    Diagnostic AHI:    HST: 7.3        LEFT VOICE MESSAGE FOR PATIENT TO RETURN CALL      Objective measures: 14 day rolling measures   COMPLIANCE LEAK AHI AVERAGE USE IN MINUTES   64 % 38.9 2.1 330   GOAL >70% GOAL < 24 LPM GOAL <5 GOAL >240          Device settings:  CPAP MIN CPAP MAX EPR RESMED SOFT RESPONSE SETTING   5 cm  H20 15 cm  H20 2 OFF         Patient has the following upcoming sleep appts:  Future Sleep Appointments         Provider Department    11/12/2024 2:00 PM (Arrive by 1:45 PM) Guillaume Quevedo MD [facility] [place]            Replacement device: No  STM ordered by provider: Yes     Total time spent on accessing and  interpreting remote patient PAP therapy data  10 minutes    Total time spent counseling, coaching  and reviewing PAP therapy data with patient  0 minutes

## 2024-11-12 ENCOUNTER — OFFICE VISIT (OUTPATIENT)
Dept: SLEEP MEDICINE | Facility: CLINIC | Age: 44
End: 2024-11-12
Payer: COMMERCIAL

## 2024-11-12 VITALS
HEIGHT: 73 IN | BODY MASS INDEX: 39.45 KG/M2 | WEIGHT: 297.7 LBS | HEART RATE: 71 BPM | SYSTOLIC BLOOD PRESSURE: 113 MMHG | DIASTOLIC BLOOD PRESSURE: 76 MMHG | OXYGEN SATURATION: 97 %

## 2024-11-12 DIAGNOSIS — G47.21 CIRCADIAN RHYTHM SLEEP DISORDER, DELAYED SLEEP PHASE TYPE: ICD-10-CM

## 2024-11-12 DIAGNOSIS — F51.12 INSUFFICIENT SLEEP SYNDROME: ICD-10-CM

## 2024-11-12 DIAGNOSIS — G47.33 OSA (OBSTRUCTIVE SLEEP APNEA): Primary | ICD-10-CM

## 2024-11-12 DIAGNOSIS — G47.19 EXCESSIVE DAYTIME SLEEPINESS: ICD-10-CM

## 2024-11-12 PROCEDURE — 99213 OFFICE O/P EST LOW 20 MIN: CPT | Performed by: INTERNAL MEDICINE

## 2024-11-12 ASSESSMENT — SLEEP AND FATIGUE QUESTIONNAIRES
HOW LIKELY ARE YOU TO NOD OFF OR FALL ASLEEP IN A CAR, WHILE STOPPED FOR A FEW MINUTES IN TRAFFIC: SLIGHT CHANCE OF DOZING
HOW LIKELY ARE YOU TO NOD OFF OR FALL ASLEEP WHILE SITTING AND READING: HIGH CHANCE OF DOZING
HOW LIKELY ARE YOU TO NOD OFF OR FALL ASLEEP WHEN YOU ARE A PASSENGER IN A CAR FOR AN HOUR WITHOUT A BREAK: MODERATE CHANCE OF DOZING
HOW LIKELY ARE YOU TO NOD OFF OR FALL ASLEEP WHILE WATCHING TV: MODERATE CHANCE OF DOZING
HOW LIKELY ARE YOU TO NOD OFF OR FALL ASLEEP WHILE SITTING QUIETLY AFTER LUNCH WITHOUT ALCOHOL: SLIGHT CHANCE OF DOZING
HOW LIKELY ARE YOU TO NOD OFF OR FALL ASLEEP WHILE LYING DOWN TO REST IN THE AFTERNOON WHEN CIRCUMSTANCES PERMIT: HIGH CHANCE OF DOZING
HOW LIKELY ARE YOU TO NOD OFF OR FALL ASLEEP WHILE SITTING INACTIVE IN A PUBLIC PLACE: MODERATE CHANCE OF DOZING
HOW LIKELY ARE YOU TO NOD OFF OR FALL ASLEEP WHILE SITTING AND TALKING TO SOMEONE: WOULD NEVER DOZE

## 2024-11-12 NOTE — PROGRESS NOTES
[facility] [address] [address]  Phone: [phone]    Patient:  Kirt Burt, Date of birth 1980  Date of Visit:  11/12/2024  Referring Provider Guillaume [facility]  Outpatient Sleep Medicine Consultation  November 12, 2024    Name: Kirt Burt MRN# [id number]   Age: 44 year old YOB: 1980     Date of Consultation: November 12, 2024  Consultation is requested by: Guillaume Quevedo MD  [address] [place] [id number]  Primary care provider: Addis Ref-Primary, Physician         Assessment and Plan:   Kirt Burt is a 44 year old male with history of hyperlipidemia, hypogonadism on testosterone supplement, generalized anxiety disorder and class II obesity has been diagnosed with mild obstructive sleep apnea with a type III home sleep test.  He was started on CPAP therapy and advised to gradually advance his sleep cycle with goals to achieve at least 8 hours of sleep.  Sleep disordered breathing.  He is currently struggling with consistent CPAP use.  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    Insufficient sleep.  On average  he is spending 6-7 hours in bed sleeping.  Based on his history, his usual sleep requirement and natural sleep appears to be around 9 hours.  This preference suggest sleep deprivation of up to 2 hours every day and only partial compensation over the weekend.  Circadian rhythm-delayed sleep phase time.  Historically he has been a late night sleep with his natural bedtime around midnight and he would wake up between 9 AM to 10 AM.  Throughout his adult life, his social and work-related commitments have led to an average sleep between 6 to 7 hours which has led to chronic sleep deprivation.  Excessive daytime  sleepiness.  Multifactorial, contributed by insufficient time in bed and sleeping compounded by poor quality of sleep due to untreated obstructive sleep apnea.    Comorbid Diagnoses:    Class II obesity.  Generalized anxiety disorder.    Summary Recommendations:    Continue auto CPAP therapy.  His counseled regarding improving his nightly usage.  Strongly advised to increase his cumulative sleep time to at least 8 hours.  He should avoid driving or operating heavy machinery when drowsy.  Follow-up in sleep clinic in 6 months to reassess effectiveness of CPAP therapy and overall PAP compliance.    Summary Counseling:    Check out [email or web address]           History of Present Illness:     Kirt Burt is a 44 year old male with above-mentioned medical history is here for a follow-up visit since initiation of CPAP therapy.  His CPAP usage has been inconsistent.  On nights when he is using the CPAP he has noticed clear improvement in the quality of his sleep and daytime symptoms.  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  This usually leaves him with 6 to 7 hours of bedtime.  Kirt continues to struggle with significant subjective daytime sleepiness.  Greater detail discussion regarding increasing his total sleep time and consistency of CPAP usage through the entirety of his sleep.    PREVIOUS SLEEP STUDIES:  Date: 07/18/2024  AHI: 7.3/h, supine AHI 8.2/h    Sleep wake schedule:   Workday bedtime 11:30 PM  Awakening 6:00 AM  using alarm   Nonworkday bedtime 11:00 PM Awakening 9:00 AM   Awakenings 0-1 for 1-2 minutes usually to use the bathroom/week  Naps: Usually avoids     Overall, he rates the experience with PAP as 8 (0 poor, 10 great). The mask is comfortable. The mask is leaking intermittently.  He is not snoring with the mask on. He is not having gasp arousals.  He is not having  "significant oral/nasal dryness. The pressure is comfortable.     His PAP interface is Full Face Mask.    He does feel rested in the morning.    Chester Sleepiness Scale: 14/24      BATSHEVA Total Score: (Patient-Rptd) 12      Objective:  CPAP Compliance Targets:   >70% days > 4 hours AHI < 5   30 days ending November 12, 2024   ResMed   Auto-PAP 5.0 - 15.0 cmH2O 30 day usage data:    40% of days with > 4 hours of use. 13/30 days with no use.   Average use 162 minutes per day.   95%ile Leak 22.33 L/min.   CPAP 95% pressure 12.6 cm.   AHI 2.5 events per hour.               Medications:     Current Outpatient Medications   Medication Sig Dispense Refill    Syringe/Needle, Disp, 22G X 1-1/2\" 1 ML MISC Inject 1 each into the muscle every 14 days 100 each 0    testosterone cypionate (DEPOTESTOSTERONE) 100 MG/ML injection Inject 0.5 mLs (50 mg) into the muscle every 14 days 10 mL 1     No current facility-administered medications for this visit.        No Known Allergies         Past Medical History:     Does not need 02 supplement at night   Past Medical History:   Diagnosis Date    Diverticulitis of colon     Generalized anxiety disorder     Intramuscular lipoma     Morbid obesity (H)              Past Surgical History:    No h/o  upper airway surgery  Past Surgical History:   Procedure Laterality Date    ARTHROSCOPY KNEE WITH MEDIAL MENISCECTOMY  5/7/2014    Procedure: ARTHROSCOPY KNEE WITH MEDIAL MENISCECTOMY;  Surgeon: Artis Dougherty MD;  Location: [facility] OR    COLONOSCOPY N/A 3/30/2021    Procedure: Colonoscopy, With Polypectomy And Biopsy;  Surgeon: Daniel Doll MD;  Location: [facility] OR    COLONOSCOPY WITH CO2 INSUFFLATION N/A 3/30/2021    Procedure: COLONOSCOPY, WITH CO2 INSUFFLATION;  Surgeon: Daniel Doll MD;  Location: [facility] OR    EXCISE MASS LOWER EXTREMITY Left 11/3/2022    Procedure: Excision left hip mass;  Surgeon: Surjit Cavanaugh MD;  Location: [facility] OR    NO HISTORY OF SURGERY           " "   Physical Examination:     Objective   Vitals: /76   Pulse 71   Ht 1.854 m (6' 0.99\")   Wt 135 kg (297 lb 11.2 oz)   SpO2 97%   BMI 39.29 kg/m     General: Healthy, alert, and no distress  Psych: Alert and oriented times 3; coherent speech, normal   rate and volume, able to articulate logical thoughts, able   to abstract reason, no tangential thoughts, no hallucinations   or delusions  His affect is normal    Copy to: No Ref-Primary, Physician    Total of 23 minutes of time was spent with patient, this included the interview and exam, and review of the chart/labs/imaging/sleep study/PAP therapy data on 11/12/2024. Greater than 50% of which was spent counseling and coordinating care.   Emy Galaviz MD 11/12/2024     [facility]   [address], [address]   [address] [place] [address]. [place] [address]   Appointments: [phone]  Emy Galaviz MD         "

## 2024-11-25 ENCOUNTER — OFFICE VISIT (OUTPATIENT)
Dept: AUDIOLOGY | Facility: OTHER | Age: 44
End: 2024-11-25
Payer: COMMERCIAL

## 2024-11-25 DIAGNOSIS — H90.3 SENSORINEURAL HEARING LOSS, BILATERAL: Primary | ICD-10-CM

## 2024-11-25 DIAGNOSIS — H93.13 TINNITUS OF BOTH EARS: ICD-10-CM

## 2024-11-25 PROCEDURE — 92550 TYMPANOMETRY & REFLEX THRESH: CPT | Performed by: AUDIOLOGIST

## 2024-11-25 PROCEDURE — 92591 PR HEARING AID EXAM BINAURAL: CPT | Performed by: AUDIOLOGIST

## 2024-11-25 PROCEDURE — 92557 COMPREHENSIVE HEARING TEST: CPT | Performed by: AUDIOLOGIST

## 2024-11-25 NOTE — PROGRESS NOTES
AUDIOLOGY REPORT    SUBJECTIVE:  Kirt Burt is a 44 year old male who was seen in the Audiology Clinic at the [facility] for audiologic evaluation, referred by self. The patient has been seen previously in this clinic on 5/25/2022 for assessment and results indicated bilateral mid to high frequency sensorineural hearing loss that was mild to moderately severe in the right ear and mild to moderate in the left ear. The patient reports bilateral tinnitus that has been getting louder. He also notes increased difficulty hearing, especially in background noise and at a distance. The patient reports a history of occupational noise exposure, but notes that it is minimal now. He reports a sensation behind his ears like his ears need to pop, but he does not have ear pain. The patient reports that he does not have a history of ear problems or ear surgery. The patient notes difficulty with communication in a variety of listening situations. He was accompanied to the appointment by his wife.    OBJECTIVE:  Otoscopic exam indicates ears are clear of cerumen bilaterally     Pure Tone Thresholds assessed using conventional audiometry with good  reliability from 250-8000 Hz bilaterally using insert earphones and circumaural headphones     RIGHT:  normal hearing sensitivity through 1000 Hz sloping to  mild to moderately severe  sensorineural hearing loss    LEFT:    normal hearing sensitivity through 1000 Hz sloping to mild to moderate sensorineural hearing loss    Tympanogram:    RIGHT: normal eardrum mobility    LEFT:   normal eardrum mobility    Reflexes (reported by stimulus ear):  RIGHT: Ipsilateral is present at normal levels  RIGHT: Contralateral is present at normal levels  LEFT:   Ipsilateral is present at normal levels  LEFT:   Contralateral is present at normal levels    Speech Reception Threshold:    RIGHT: 40 dB HL    LEFT:   65 dB HL    Word Recognition Score:     RIGHT: 100% at 80 dB HL  using NU-6 recorded word list.    LEFT:   92% at 85 dB HL using NU-6 recorded word list.    Patient is a hearing aid candidate. Patient would like to move forward with a hearing aid evaluation today. Therefore, the patient was presented with different options for amplification to help aid in communication. Discussed styles, levels of technology and monaural vs. binaural fitting.     The hearing aid(s) mutually chosen were:  Binaural: Oticon Intent 3 miniRITE R  COLOR: Antony blue  BATTERY SIZE: rechargeable  EARMOLD/TIPS: domes  CANAL/ LENGTH: 2 85    ASSESSMENT:   Compared to patient's previous audiogram dated 5/25/2022, hearing has remained essentially stable. Today s results were discussed with the patient in detail.     Reviewed purchase information and warranty information with patient. The 45 day trial period was explained to patient. The patient was given a copy of the [place] Department of [employer] consumer brochure on purchasing hearing instruments. Patient risk factors have been provided to the patient in writing prior to the sale of the hearing aid per FDA regulation. The risk factors are also available in the User Instructional Booklet to be presented on the day of the hearing aid fitting. Hearing aid(s) ordered. Hearing aid evaluation completed.Today s results were discussed with the patient in detail.     PLAN:  Patient was counseled regarding hearing loss and impact on communication. Kirt is scheduled to return in 2-3 weeks for a hearing aid fitting and programming. Purchase agreement will be completed on that date. Please call this clinic with questions regarding these results or recommendations.      Jamal Gibson, CCC-A  [place] Licensed Audiologist #[id number]  11/25/2024

## 2024-12-12 DIAGNOSIS — R79.89 LOW TESTOSTERONE IN MALE: ICD-10-CM

## 2024-12-12 RX ORDER — TESTOSTERONE CYPIONATE 1000 MG/10ML
INJECTION, SOLUTION INTRAMUSCULAR
Qty: 10 ML | Refills: 0 | OUTPATIENT
Start: 2024-12-12

## 2024-12-23 ENCOUNTER — OFFICE VISIT (OUTPATIENT)
Dept: AUDIOLOGY | Facility: OTHER | Age: 44
End: 2024-12-23
Payer: COMMERCIAL

## 2024-12-23 DIAGNOSIS — H90.3 BILATERAL SENSORINEURAL HEARING LOSS: Primary | ICD-10-CM

## 2024-12-23 DIAGNOSIS — H93.13 BILATERAL TINNITUS: ICD-10-CM

## 2024-12-23 NOTE — PROGRESS NOTES
AUDIOLOGY REPORT    SUBJECTIVE: Kirt Burt, a 44 year old male, was seen in the Audiology Clinic at [facility] today for a Binaural hearing aid fitting. Previous results have revealed a bilateral sensorineural hearing loss. The patient was unaccompanied to today's appointment.      OBJECTIVE:  Prior to fitting, a hearing aid check was performed to ensure device functionality. The hearing aid conformity evaluation was completed.The hearing aids were placed and they provided a good fit. Real-ear-probe-microphone measurements were completed on the [place] system and were a good match to NAL-NL2 target with soft sounds audible, moderate sounds comfortable, and loud sounds below discomfort. UCLs are verified through maximum power output measures and demonstrate appropriate limiting of loud inputs. Mr. Burt was oriented to proper hearing aid use, care, cleaning (no water, dry brush), batteries (rechargeable,  use, low-battery signal), aid insertion/removal, user booklet, warranty information, storage cases, and other hearing aid details. The patient confirmed understanding of hearing aid use and care, and showed proper insertion of hearing aid and batteries while in the office today. Mr. Burt reported good volume and sound quality today.    EAR(S) FIT: Binaural  HEARING AID MAKE: Right: Oticon; Left: Oticon    HEARING AID MODEL #: Right: Intent 3 miniRITE R; Left: Intent 3 miniRITE R  HEARING AID STYLE: Right: CHARMAINE; Left: CHARMAINE  DOME SIZE: Right:  10 mm open bass; Left::  10 mm open bass   LENGTH: Right:  2 85; Left:  2 85  EARMOLDS: Right:  ; Left:     SERIAL NUMBERS: Right: [id number]; Left: [id number]  WARRANTY END DATE: Right: 12/25/2027; Left: 12/25/2027    ASSESSMENT: Binaural hearing aid fitting completed today. Verification measures were performed. The 45 day trial period was explained to patient, and they expressed understanding. Mr. Burt signed the Hearing Aid Purchase  Agreement and was given a copy, as well as details on his hearing aids. Patient was counseled that exact out of pocket amounts cannot be determined for hearing aid claims being sent to insurance. Any insurance coverage information presented to the patient is an estimate only, and is not a guarantee of payment. Patient has been advised to check with their own insurance.    PLAN: Mr. Burt will return for follow-up in 2-3 weeks for a hearing aid review appointment. Please call this clinic with questions regarding today s appointment.    Jamal Gibson, CCC-A  [place] Licensed Audiologist #[id number]  12/23/2024

## 2024-12-23 NOTE — PATIENT INSTRUCTIONS

## 2025-01-02 ENCOUNTER — OFFICE VISIT (OUTPATIENT)
Dept: FAMILY MEDICINE | Facility: OTHER | Age: 45
End: 2025-01-02
Payer: COMMERCIAL

## 2025-01-02 VITALS
WEIGHT: 295 LBS | BODY MASS INDEX: 41.3 KG/M2 | OXYGEN SATURATION: 95 % | RESPIRATION RATE: 16 BRPM | HEART RATE: 82 BPM | TEMPERATURE: 98.3 F | DIASTOLIC BLOOD PRESSURE: 74 MMHG | HEIGHT: 71 IN | SYSTOLIC BLOOD PRESSURE: 116 MMHG

## 2025-01-02 DIAGNOSIS — G47.33 OSA (OBSTRUCTIVE SLEEP APNEA): ICD-10-CM

## 2025-01-02 DIAGNOSIS — E78.5 HYPERLIPIDEMIA LDL GOAL <130: ICD-10-CM

## 2025-01-02 DIAGNOSIS — E66.01 MORBID OBESITY (H): ICD-10-CM

## 2025-01-02 DIAGNOSIS — R79.89 LOW TESTOSTERONE IN MALE: Primary | ICD-10-CM

## 2025-01-02 RX ORDER — TESTOSTERONE CYPIONATE 1000 MG/10ML
50 INJECTION, SOLUTION INTRAMUSCULAR
Qty: 10 ML | Refills: 1 | Status: SHIPPED | OUTPATIENT
Start: 2025-01-02

## 2025-01-02 ASSESSMENT — PAIN SCALES - GENERAL: PAINLEVEL_OUTOF10: NO PAIN (0)

## 2025-01-02 ASSESSMENT — ANXIETY QUESTIONNAIRES
2. NOT BEING ABLE TO STOP OR CONTROL WORRYING: NOT AT ALL
1. FEELING NERVOUS, ANXIOUS, OR ON EDGE: NOT AT ALL
3. WORRYING TOO MUCH ABOUT DIFFERENT THINGS: NOT AT ALL
GAD7 TOTAL SCORE: 4
8. IF YOU CHECKED OFF ANY PROBLEMS, HOW DIFFICULT HAVE THESE MADE IT FOR YOU TO DO YOUR WORK, TAKE CARE OF THINGS AT HOME, OR GET ALONG WITH OTHER PEOPLE?: SOMEWHAT DIFFICULT
GAD7 TOTAL SCORE: 4
7. FEELING AFRAID AS IF SOMETHING AWFUL MIGHT HAPPEN: NOT AT ALL
IF YOU CHECKED OFF ANY PROBLEMS ON THIS QUESTIONNAIRE, HOW DIFFICULT HAVE THESE PROBLEMS MADE IT FOR YOU TO DO YOUR WORK, TAKE CARE OF THINGS AT HOME, OR GET ALONG WITH OTHER PEOPLE: SOMEWHAT DIFFICULT
4. TROUBLE RELAXING: MORE THAN HALF THE DAYS
7. FEELING AFRAID AS IF SOMETHING AWFUL MIGHT HAPPEN: NOT AT ALL
6. BECOMING EASILY ANNOYED OR IRRITABLE: SEVERAL DAYS
GAD7 TOTAL SCORE: 4
5. BEING SO RESTLESS THAT IT IS HARD TO SIT STILL: SEVERAL DAYS

## 2025-01-02 NOTE — PROGRESS NOTES
"  Assessment & Plan   The longitudinal plan of care for the diagnosis(es)/condition(s) as documented were addressed during this visit. Due to the added complexity in care, I will continue to support Kirt in the subsequent management and with ongoing continuity of care.     Low testosterone in male  Stable progress at this point in time.  Refilled medications for him.  He does need to have his labs drawn today.  Recognize that admit date might be elevated more than typical.  We await results.  - testosterone cypionate (DEPOTESTOSTERONE) 100 MG/ML injection; Inject 0.5 mLs (50 mg) into the muscle every 14 days.  - Testosterone Free and Total; Future  - Syringe/Needle, Disp, 22G X 1-1/2\" 1 ML MISC; Inject 1 each into the muscle every 14 days.  - Testosterone Free and Total    Morbid obesity (H)  MARCO (obstructive sleep apnea)  Still problematic.  He continues to treat.    Hyperlipidemia LDL goal <130  Rechecking LDL cholesterol today.  Follow-up as needed.  - LDL cholesterol direct; Future  - LDL cholesterol direct          BMI  Estimated body mass index is 41.25 kg/m  as calculated from the following:    Height as of this encounter: 1.801 m (5' 10.91\").    Weight as of this encounter: 133.8 kg (295 lb).   Weight management plan: Discussed healthy diet and exercise guidelines      Work on weight loss  Regular exercise    Subjective   Kirt is a 44 year old, presenting for the following health issues:  Recheck Medication      1/2/2025     1:21 PM   Additional Questions   Roomed by ko   Accompanied by self     History of Present Illness       Reason for visit:  Rx refill    He eats 2-3 servings of fruits and vegetables daily.He consumes 1 sweetened beverage(s) daily.He exercises with enough effort to increase his heart rate 10 to 19 minutes per day.  He exercises with enough effort to increase his heart rate 3 or less days per week.   He is taking medications regularly.         Review of Systems  Constitutional, HEENT, " "cardiovascular, pulmonary, GI, , musculoskeletal, neuro, skin, endocrine and psych systems are negative, except as otherwise noted.      Objective    /74   Pulse 82   Temp 98.3  F (36.8  C) (Temporal)   Resp 16   Ht 1.801 m (5' 10.91\")   Wt 133.8 kg (295 lb)   SpO2 95%   BMI 41.25 kg/m    Body mass index is 41.25 kg/m .  Physical Exam   GENERAL: alert and no distress  NECK: no adenopathy, no asymmetry, masses, or scars  RESP: lungs clear to auscultation - no rales, rhonchi or wheezes  CV: regular rate and rhythm, normal S1 S2, no S3 or S4, no murmur, click or rub, no peripheral edema  ABDOMEN: soft, nontender, no hepatosplenomegaly, no masses and bowel sounds normal  MS: no gross musculoskeletal defects noted, no edema    Results for orders placed or performed in visit on 01/02/25 (from the past 24 hours)   Testosterone Free and Total    Narrative    The following orders were created for panel order Testosterone Free and Total.  Procedure                               Abnormality         Status                     ---------                               -----------         ------                     Sex Hormone Binding Glob...[[id number]]                                                 Testosterone Free and Total[[id number]]                                                   Please view results for these tests on the individual orders.           Signed Electronically by: Bran Corea PA-C    "

## 2025-01-05 LAB
TESTOST FREE SERPL-MCNC: 5.57 NG/DL
TESTOST SERPL-MCNC: 219 NG/DL (ref 240–950)

## 2025-06-21 ENCOUNTER — HEALTH MAINTENANCE LETTER (OUTPATIENT)
Age: 45
End: 2025-06-21

## 2025-07-02 DIAGNOSIS — R79.89 LOW TESTOSTERONE IN MALE: ICD-10-CM

## 2025-07-03 RX ORDER — TESTOSTERONE CYPIONATE 1000 MG/10ML
50 INJECTION, SOLUTION INTRAMUSCULAR
Qty: 10 ML | Refills: 1 | OUTPATIENT
Start: 2025-07-03

## 2025-07-05 DIAGNOSIS — R79.89 LOW TESTOSTERONE IN MALE: ICD-10-CM

## 2025-07-07 RX ORDER — TESTOSTERONE CYPIONATE 1000 MG/10ML
INJECTION, SOLUTION INTRAMUSCULAR
Qty: 10 ML | Refills: 0 | OUTPATIENT
Start: 2025-07-07

## 2025-07-12 DIAGNOSIS — R79.89 LOW TESTOSTERONE IN MALE: ICD-10-CM

## 2025-07-14 RX ORDER — TESTOSTERONE CYPIONATE 1000 MG/10ML
INJECTION, SOLUTION INTRAMUSCULAR
Qty: 10 ML | Refills: 0 | OUTPATIENT
Start: 2025-07-14

## 2025-07-17 ENCOUNTER — TELEPHONE (OUTPATIENT)
Dept: FAMILY MEDICINE | Facility: OTHER | Age: 45
End: 2025-07-17
Payer: COMMERCIAL

## 2025-07-17 NOTE — TELEPHONE ENCOUNTER
Patient calling asking why refill has been denied for testosterone injections. Per previous encounters, provider advised needs visit prior to further refills. Patient states he should have another refill on file with pharmacy but they are telling him he is out. Writer again advised per previous provider documentation, patient needs appointment. Appointment was made for Monday. Advised of arrival time.     Writer called and spoke with pharmacy. Pharmacy states order is over 6 months old, prescription is . Would need new script. Pharmacy states they have told this to patient.    Veronica Gutierrez RN on 2025 at 2:55 PM

## 2025-07-21 ENCOUNTER — OFFICE VISIT (OUTPATIENT)
Dept: FAMILY MEDICINE | Facility: OTHER | Age: 45
End: 2025-07-21
Payer: COMMERCIAL

## 2025-07-21 VITALS
RESPIRATION RATE: 16 BRPM | WEIGHT: 303 LBS | OXYGEN SATURATION: 94 % | DIASTOLIC BLOOD PRESSURE: 84 MMHG | HEART RATE: 78 BPM | BODY MASS INDEX: 42.37 KG/M2 | SYSTOLIC BLOOD PRESSURE: 112 MMHG | TEMPERATURE: 97.3 F

## 2025-07-21 DIAGNOSIS — E66.01 MORBID OBESITY (H): ICD-10-CM

## 2025-07-21 DIAGNOSIS — R79.89 LOW TESTOSTERONE IN MALE: ICD-10-CM

## 2025-07-21 DIAGNOSIS — E29.1 HYPOGONADISM MALE: Primary | ICD-10-CM

## 2025-07-21 PROCEDURE — 99213 OFFICE O/P EST LOW 20 MIN: CPT | Performed by: PHYSICIAN ASSISTANT

## 2025-07-21 PROCEDURE — G2211 COMPLEX E/M VISIT ADD ON: HCPCS | Performed by: PHYSICIAN ASSISTANT

## 2025-07-21 PROCEDURE — 3079F DIAST BP 80-89 MM HG: CPT | Performed by: PHYSICIAN ASSISTANT

## 2025-07-21 PROCEDURE — 3074F SYST BP LT 130 MM HG: CPT | Performed by: PHYSICIAN ASSISTANT

## 2025-07-21 RX ORDER — TESTOSTERONE CYPIONATE 1000 MG/10ML
50 INJECTION, SOLUTION INTRAMUSCULAR
Qty: 10 ML | Refills: 3 | Status: SHIPPED | OUTPATIENT
Start: 2025-07-21

## 2025-07-21 NOTE — PROGRESS NOTES
"  Assessment & Plan     Low testosterone in male  Refilled medications today.  Advised that he get labs done towards the end of his injection.  To find out if he is therapeutic.  May want to change this to weekly injection since he can definitely notice a difference towards the tail end.  Alternatively could increase his dose for the biweekly injections.  Follow-up based on results.  - testosterone cypionate (DEPOTESTOSTERONE) 100 MG/ML injection; Inject 0.5 mLs (50 mg) into the muscle every 14 days.  - Testosterone Free and Total; Future  - Basic metabolic panel  (Ca, Cl, CO2, Creat, Gluc, K, Na, BUN); Future  - Hemoglobin A1c; Future  - tirzepatide-weight management (ZEPBOUND) 2.5 MG/0.5ML vial; Inject 0.5 mLs (2.5 mg) subcutaneously once a week.    Hypogonadism male  See above.  - Testosterone Free and Total; Future  - Basic metabolic panel  (Ca, Cl, CO2, Creat, Gluc, K, Na, BUN); Future  - Hemoglobin A1c; Future  - tirzepatide-weight management (ZEPBOUND) 2.5 MG/0.5ML vial; Inject 0.5 mLs (2.5 mg) subcutaneously once a week.    Morbid obesity (H)  A conversation around diet exercise and GLP-1 therapy.  Trial prescription sent to the pharmacy for him to see what they will do with it from an insurance standpoint.  Follow-up in 2 to 3 months is advised.  - Testosterone Free and Total; Future  - Basic metabolic panel  (Ca, Cl, CO2, Creat, Gluc, K, Na, BUN); Future  - Hemoglobin A1c; Future  - tirzepatide-weight management (ZEPBOUND) 2.5 MG/0.5ML vial; Inject 0.5 mLs (2.5 mg) subcutaneously once a week.    The longitudinal plan of care for the diagnosis(es)/condition(s) as documented were addressed during this visit. Due to the added complexity in care, I will continue to support Kirt in the subsequent management and with ongoing continuity of care.    BMI  Estimated body mass index is 42.37 kg/m  as calculated from the following:    Height as of 1/2/25: 1.801 m (5' 10.91\").    Weight as of this encounter: 137.4 kg " (303 lb).   Weight management plan: Specific weight management program called GLP-1 therapy discussed    Subjective   Kirt is a 45 year old, presenting for the following health issues:  Recheck Medication      7/21/2025    10:19 AM   Additional Questions   Roomed by chidi     History of Present Illness       Reason for visit:  Prescription refills    He eats 2-3 servings of fruits and vegetables daily.He consumes 1 sweetened beverage(s) daily.He exercises with enough effort to increase his heart rate 10 to 19 minutes per day.  He exercises with enough effort to increase his heart rate 3 or less days per week.   He is taking medications regularly.            Review of Systems  Constitutional, HEENT, cardiovascular, pulmonary, GI, , musculoskeletal, neuro, skin, endocrine and psych systems are negative, except as otherwise noted.      Objective    /84 (BP Location: Right arm, Cuff Size: Adult Large)   Pulse 78   Temp 97.3  F (36.3  C) (Temporal)   Resp 16   Wt (!) 303 lb (137.4 kg)   SpO2 94%   BMI 42.37 kg/m    Body mass index is 42.37 kg/m .  Physical Exam   GENERAL: alert and no distress  NECK: no adenopathy, no asymmetry, masses, or scars  RESP: lungs clear to auscultation - no rales, rhonchi or wheezes  CV: regular rate and rhythm, normal S1 S2, no S3 or S4, no murmur, click or rub, no peripheral edema  ABDOMEN: soft, nontender, no hepatosplenomegaly, no masses and bowel sounds normal  MS: no gross musculoskeletal defects noted, no edema    No results found for this or any previous visit (from the past 24 hours).        Signed Electronically by: Bran Corea PA-C

## (undated) DEVICE — GLOVE PROTEXIS POWDER FREE SMT 7.0  2D72PT70X

## (undated) DEVICE — SPECIMEN CONTAINER 5OZ STERILE 2600SA

## (undated) DEVICE — GLOVE PROTEXIS W/NEU-THERA 7.0  2D73TE70

## (undated) DEVICE — SU VICRYL 2-0 SH 27" UND J417H

## (undated) DEVICE — ESU GROUND PAD ADULT W/CORD E7507

## (undated) DEVICE — SUCTION MANIFOLD NEPTUNE 2 SYS 1 PORT 702-025-000

## (undated) DEVICE — PREP SKIN SCRUB TRAY 4461A

## (undated) DEVICE — LINEN ORTHO PACK 5446

## (undated) DEVICE — PREP DURAPREP REMOVER 4OZ 8611

## (undated) DEVICE — GLOVE PROTEXIS BLUE W/NEU-THERA 7.5  2D73EB75

## (undated) DEVICE — PREP POVIDONE-IODINE 7.5% SCRUB 4OZ BOTTLE MDS093945

## (undated) DEVICE — CAST PADDING 4" UNSTERILE 9044

## (undated) DEVICE — SOL WATER IRRIG 1000ML BOTTLE 07139-09

## (undated) DEVICE — GOWN XLG DISP 9545

## (undated) DEVICE — PREP CHLORAPREP 26ML TINTED ORANGE  260815

## (undated) DEVICE — DRAPE STERI U 1015

## (undated) DEVICE — PREP DURAPREP 26ML APL 8630

## (undated) DEVICE — SU MONOCRYL 4-0 PS-2 18" UND Y496G

## (undated) DEVICE — SOL NACL 0.9% IRRIG 500ML BOTTLE 2F7123

## (undated) RX ORDER — OXYCODONE HYDROCHLORIDE 5 MG/1
TABLET ORAL
Status: DISPENSED
Start: 2022-11-03

## (undated) RX ORDER — CEFAZOLIN SODIUM 1 G/3ML
INJECTION, POWDER, FOR SOLUTION INTRAMUSCULAR; INTRAVENOUS
Status: DISPENSED
Start: 2022-11-03

## (undated) RX ORDER — ACETAMINOPHEN 325 MG/1
TABLET ORAL
Status: DISPENSED
Start: 2022-11-03

## (undated) RX ORDER — PROPOFOL 10 MG/ML
INJECTION, EMULSION INTRAVENOUS
Status: DISPENSED
Start: 2022-11-03

## (undated) RX ORDER — FENTANYL CITRATE 50 UG/ML
INJECTION, SOLUTION INTRAMUSCULAR; INTRAVENOUS
Status: DISPENSED
Start: 2021-03-30

## (undated) RX ORDER — GABAPENTIN 300 MG/1
CAPSULE ORAL
Status: DISPENSED
Start: 2022-11-03

## (undated) RX ORDER — DEXAMETHASONE SODIUM PHOSPHATE 4 MG/ML
INJECTION, SOLUTION INTRA-ARTICULAR; INTRALESIONAL; INTRAMUSCULAR; INTRAVENOUS; SOFT TISSUE
Status: DISPENSED
Start: 2022-11-03

## (undated) RX ORDER — FENTANYL CITRATE 50 UG/ML
INJECTION, SOLUTION INTRAMUSCULAR; INTRAVENOUS
Status: DISPENSED
Start: 2022-11-03

## (undated) RX ORDER — BUPIVACAINE HYDROCHLORIDE 2.5 MG/ML
INJECTION, SOLUTION EPIDURAL; INFILTRATION; INTRACAUDAL
Status: DISPENSED
Start: 2022-11-03

## (undated) RX ORDER — LIDOCAINE HYDROCHLORIDE 20 MG/ML
INJECTION, SOLUTION EPIDURAL; INFILTRATION; INTRACAUDAL; PERINEURAL
Status: DISPENSED
Start: 2022-11-03

## (undated) RX ORDER — ONDANSETRON 2 MG/ML
INJECTION INTRAMUSCULAR; INTRAVENOUS
Status: DISPENSED
Start: 2022-11-03